# Patient Record
Sex: FEMALE | Race: OTHER | NOT HISPANIC OR LATINO | Employment: OTHER | URBAN - METROPOLITAN AREA
[De-identification: names, ages, dates, MRNs, and addresses within clinical notes are randomized per-mention and may not be internally consistent; named-entity substitution may affect disease eponyms.]

---

## 2023-07-28 ENCOUNTER — OFFICE VISIT (OUTPATIENT)
Dept: PRIMARY CARE | Facility: CLINIC | Age: 33
End: 2023-07-28
Payer: COMMERCIAL

## 2023-07-28 VITALS
HEART RATE: 83 BPM | SYSTOLIC BLOOD PRESSURE: 150 MMHG | BODY MASS INDEX: 50.02 KG/M2 | TEMPERATURE: 96 F | DIASTOLIC BLOOD PRESSURE: 80 MMHG | HEIGHT: 64 IN | WEIGHT: 293 LBS | OXYGEN SATURATION: 96 %

## 2023-07-28 DIAGNOSIS — Z00.00 PHYSICAL EXAM: ICD-10-CM

## 2023-07-28 DIAGNOSIS — F41.9 ANXIETY: Primary | ICD-10-CM

## 2023-07-28 DIAGNOSIS — R09.81 CHRONIC NASAL CONGESTION: ICD-10-CM

## 2023-07-28 DIAGNOSIS — E66.01 CLASS 3 SEVERE OBESITY DUE TO EXCESS CALORIES WITHOUT SERIOUS COMORBIDITY WITH BODY MASS INDEX (BMI) OF 60.0 TO 69.9 IN ADULT (MULTI): ICD-10-CM

## 2023-07-28 DIAGNOSIS — I10 HYPERTENSION, UNSPECIFIED TYPE: ICD-10-CM

## 2023-07-28 PROCEDURE — 3077F SYST BP >= 140 MM HG: CPT | Performed by: NURSE PRACTITIONER

## 2023-07-28 PROCEDURE — 3008F BODY MASS INDEX DOCD: CPT | Performed by: NURSE PRACTITIONER

## 2023-07-28 PROCEDURE — 1036F TOBACCO NON-USER: CPT | Performed by: NURSE PRACTITIONER

## 2023-07-28 PROCEDURE — 3079F DIAST BP 80-89 MM HG: CPT | Performed by: NURSE PRACTITIONER

## 2023-07-28 PROCEDURE — 99385 PREV VISIT NEW AGE 18-39: CPT | Performed by: NURSE PRACTITIONER

## 2023-07-28 RX ORDER — FLUOXETINE 10 MG/1
10 CAPSULE ORAL
Qty: 30 CAPSULE | Refills: 5 | Status: SHIPPED | OUTPATIENT
Start: 2023-07-28 | End: 2023-08-21

## 2023-07-28 RX ORDER — HYDROCHLOROTHIAZIDE 25 MG/1
25 TABLET ORAL DAILY
COMMUNITY
Start: 2018-02-14 | End: 2023-07-28 | Stop reason: SDUPTHER

## 2023-07-28 RX ORDER — ARIPIPRAZOLE 5 MG/1
5 TABLET ORAL DAILY
COMMUNITY
Start: 2018-04-16 | End: 2023-11-09 | Stop reason: SDUPTHER

## 2023-07-28 RX ORDER — FLUOXETINE 10 MG/1
10 CAPSULE ORAL
COMMUNITY
End: 2023-07-28 | Stop reason: SDUPTHER

## 2023-07-28 RX ORDER — HYDROCHLOROTHIAZIDE 25 MG/1
25 TABLET ORAL DAILY
Qty: 30 TABLET | Refills: 5 | Status: SHIPPED | OUTPATIENT
Start: 2023-07-28 | End: 2023-08-21

## 2023-07-28 ASSESSMENT — LIFESTYLE VARIABLES: HOW OFTEN DO YOU HAVE A DRINK CONTAINING ALCOHOL: NEVER

## 2023-07-28 ASSESSMENT — PATIENT HEALTH QUESTIONNAIRE - PHQ9
SUM OF ALL RESPONSES TO PHQ9 QUESTIONS 1 AND 2: 0
1. LITTLE INTEREST OR PLEASURE IN DOING THINGS: NOT AT ALL
2. FEELING DOWN, DEPRESSED OR HOPELESS: NOT AT ALL

## 2023-07-28 NOTE — PATIENT INSTRUCTIONS
Continue medications as prescribed.  Healthy diet and drink plenty of water.  Please have labs drawn-You will be notified with abnormal results only.    SEE MY CHART FOR RESULT.- If you have any questions please do not hesitate to notify our office.    Please schedule all necessary health screenings ophthalmology and dentist.    Follow-up in 4 MONTHS SOONER IF NEEDED.   Call office any new concerns.
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
## 2023-10-18 DIAGNOSIS — R10.2 PELVIC PAIN SYNDROME: Primary | ICD-10-CM

## 2023-10-26 PROBLEM — R10.2 PELVIC PAIN SYNDROME: Status: ACTIVE | Noted: 2023-10-18

## 2023-11-09 ENCOUNTER — OFFICE VISIT (OUTPATIENT)
Dept: PRIMARY CARE | Facility: CLINIC | Age: 33
End: 2023-11-09
Payer: COMMERCIAL

## 2023-11-09 VITALS
BODY MASS INDEX: 62.41 KG/M2 | RESPIRATION RATE: 18 BRPM | OXYGEN SATURATION: 99 % | HEART RATE: 76 BPM | SYSTOLIC BLOOD PRESSURE: 118 MMHG | WEIGHT: 293 LBS | DIASTOLIC BLOOD PRESSURE: 74 MMHG | TEMPERATURE: 97.7 F

## 2023-11-09 DIAGNOSIS — F31.10 BIPOLAR AFFECTIVE DISORDER, CURRENT EPISODE MANIC, CURRENT EPISODE SEVERITY UNSPECIFIED (MULTI): Primary | ICD-10-CM

## 2023-11-09 DIAGNOSIS — F41.9 ANXIETY: ICD-10-CM

## 2023-11-09 DIAGNOSIS — I10 HYPERTENSION, UNSPECIFIED TYPE: ICD-10-CM

## 2023-11-09 PROCEDURE — 1036F TOBACCO NON-USER: CPT | Performed by: NURSE PRACTITIONER

## 2023-11-09 PROCEDURE — 3074F SYST BP LT 130 MM HG: CPT | Performed by: NURSE PRACTITIONER

## 2023-11-09 PROCEDURE — 3078F DIAST BP <80 MM HG: CPT | Performed by: NURSE PRACTITIONER

## 2023-11-09 PROCEDURE — 3008F BODY MASS INDEX DOCD: CPT | Performed by: NURSE PRACTITIONER

## 2023-11-09 PROCEDURE — 99214 OFFICE O/P EST MOD 30 MIN: CPT | Performed by: NURSE PRACTITIONER

## 2023-11-09 RX ORDER — HYDROCHLOROTHIAZIDE 25 MG/1
25 TABLET ORAL DAILY
Qty: 90 TABLET | Refills: 2 | Status: SHIPPED | OUTPATIENT
Start: 2023-11-09 | End: 2024-11-08

## 2023-11-09 RX ORDER — ARIPIPRAZOLE 5 MG/1
5 TABLET ORAL DAILY
Qty: 90 TABLET | Refills: 1 | Status: SHIPPED | OUTPATIENT
Start: 2023-11-09 | End: 2024-11-08

## 2023-11-09 RX ORDER — FLUOXETINE 10 MG/1
10 CAPSULE ORAL DAILY
Qty: 90 CAPSULE | Refills: 1 | Status: SHIPPED | OUTPATIENT
Start: 2023-11-09 | End: 2024-11-08

## 2023-11-09 ASSESSMENT — PATIENT HEALTH QUESTIONNAIRE - PHQ9
1. LITTLE INTEREST OR PLEASURE IN DOING THINGS: NOT AT ALL
2. FEELING DOWN, DEPRESSED OR HOPELESS: NOT AT ALL
SUM OF ALL RESPONSES TO PHQ9 QUESTIONS 1 AND 2: 0

## 2023-11-09 ASSESSMENT — ENCOUNTER SYMPTOMS: NERVOUS/ANXIOUS: 1

## 2023-11-09 ASSESSMENT — PAIN SCALES - GENERAL: PAINLEVEL: 0-NO PAIN

## 2023-11-09 NOTE — PROGRESS NOTES
Subjective   Patient ID: Alayna Andrea is a 33 y.o. female who presents for 4 month follow up (Anxiety).    HPI     Patient presents to clinic for follow-up visit.  States she has been doing okay she has no specific complaints or concerns today.  Has been working on weight loss.  She states she has been eating a healthy diet and eating lots of vegetables.  Patient with history of anxiety depression bipolar being followed by counselors weekly at [facility].  She tells me she has taken anger management class she lost her children who is currently living with her mother but states she would like to regain custody of her children.        Appetite normal bowel and bladder normal.        Review of Systems   Psychiatric/Behavioral:  The patient is nervous/anxious.        Objective   /74 (BP Location: Right arm, Patient Position: Sitting, BP Cuff Size: Large adult)   Pulse 76   Temp 36.5 °C (97.7 °F) (Temporal)   Resp 18   Wt (!) 165 kg (363 lb 9.6 oz)   LMP 11/04/2023 (Exact Date)   SpO2 99%   BMI 62.41 kg/m²     Physical Exam  Vitals reviewed.   Constitutional:       Appearance: Normal appearance.   Cardiovascular:      Rate and Rhythm: Normal rate and regular rhythm.   Pulmonary:      Effort: Pulmonary effort is normal.      Breath sounds: Normal breath sounds.   Musculoskeletal:         General: Normal range of motion.   Skin:     General: Skin is warm and dry.   Neurological:      Mental Status: She is alert and oriented to person, place, and time. Mental status is at baseline.   Psychiatric:         Mood and Affect: Mood normal.      Comments: Very Talkative         Assessment/Plan   Problem List Items Addressed This Visit    None  Visit Diagnoses       Bipolar affective disorder, current episode manic, current episode severity unspecified (CMS/HCC)    -  Primary    Anxiety        Relevant Medications    ARIPiprazole (Abilify) 5 mg tablet    FLUoxetine (PROzac) 10 mg capsule  Follow-up with  counselors and psychiatrist at [facility] as scheduled.      Hypertension, unspecified type      Controlled at 118/74    Relevant Medications    hydroCHLOROthiazide (HYDRODiuril) 25 mg tablet  Healthy low-sodium diet  Follow-up 4 months.

## 2023-11-09 NOTE — PATIENT INSTRUCTIONS
Continue medications as prescribed.  Healthy diet and drink plenty of water.  PLEASE FOLLOW-UP WITH PSYCHIATRY AT [facility].  Follow-up in 4 MONTHS SOONER IF NEEDED.  Call office any new concerns.

## 2023-11-13 ENCOUNTER — APPOINTMENT (OUTPATIENT)
Dept: ORTHOPEDIC SURGERY | Facility: CLINIC | Age: 33
End: 2023-11-13
Payer: COMMERCIAL

## 2023-11-13 ENCOUNTER — PRE-ADMISSION TESTING (OUTPATIENT)
Dept: PREADMISSION TESTING | Facility: HOSPITAL | Age: 33
End: 2023-11-13
Payer: COMMERCIAL

## 2023-11-13 VITALS
BODY MASS INDEX: 50.02 KG/M2 | WEIGHT: 293 LBS | OXYGEN SATURATION: 99 % | HEART RATE: 80 BPM | TEMPERATURE: 98.7 F | DIASTOLIC BLOOD PRESSURE: 84 MMHG | HEIGHT: 64 IN | SYSTOLIC BLOOD PRESSURE: 142 MMHG

## 2023-11-13 DIAGNOSIS — I10 HYPERTENSION, UNSPECIFIED TYPE: ICD-10-CM

## 2023-11-13 DIAGNOSIS — R01.1 MURMUR, CARDIAC: ICD-10-CM

## 2023-11-13 DIAGNOSIS — Z01.818 PREOPERATIVE EXAMINATION: Primary | ICD-10-CM

## 2023-11-13 LAB
ABO GROUP (TYPE) IN BLOOD: NORMAL
ANION GAP SERPL CALC-SCNC: 11 MMOL/L (ref 10–20)
ANTIBODY SCREEN: NORMAL
BUN SERPL-MCNC: 11 MG/DL (ref 6–23)
CALCIUM SERPL-MCNC: 9.3 MG/DL (ref 8.6–10.6)
CHLORIDE SERPL-SCNC: 104 MMOL/L (ref 98–107)
CO2 SERPL-SCNC: 30 MMOL/L (ref 21–32)
CREAT SERPL-MCNC: 0.62 MG/DL (ref 0.5–1.05)
ERYTHROCYTE [DISTWIDTH] IN BLOOD BY AUTOMATED COUNT: 13 % (ref 11.5–14.5)
GFR SERPL CREATININE-BSD FRML MDRD: >90 ML/MIN/1.73M*2
GLUCOSE SERPL-MCNC: 93 MG/DL (ref 74–99)
HCT VFR BLD AUTO: 37 % (ref 36–46)
HGB BLD-MCNC: 11.8 G/DL (ref 12–16)
MCH RBC QN AUTO: 29.3 PG (ref 26–34)
MCHC RBC AUTO-ENTMCNC: 31.9 G/DL (ref 32–36)
MCV RBC AUTO: 92 FL (ref 80–100)
NRBC BLD-RTO: 0 /100 WBCS (ref 0–0)
PLATELET # BLD AUTO: 200 X10*3/UL (ref 150–450)
POTASSIUM SERPL-SCNC: 3.9 MMOL/L (ref 3.5–5.3)
RBC # BLD AUTO: 4.03 X10*6/UL (ref 4–5.2)
RH FACTOR (ANTIGEN D): NORMAL
SODIUM SERPL-SCNC: 141 MMOL/L (ref 136–145)
WBC # BLD AUTO: 5.5 X10*3/UL (ref 4.4–11.3)

## 2023-11-13 PROCEDURE — 36415 COLL VENOUS BLD VENIPUNCTURE: CPT

## 2023-11-13 PROCEDURE — 85027 COMPLETE CBC AUTOMATED: CPT

## 2023-11-13 PROCEDURE — 87081 CULTURE SCREEN ONLY: CPT

## 2023-11-13 PROCEDURE — 86850 RBC ANTIBODY SCREEN: CPT

## 2023-11-13 PROCEDURE — 80048 BASIC METABOLIC PNL TOTAL CA: CPT

## 2023-11-13 PROCEDURE — 99205 OFFICE O/P NEW HI 60 MIN: CPT | Performed by: NURSE PRACTITIONER

## 2023-11-13 RX ORDER — CHLORHEXIDINE GLUCONATE ORAL RINSE 1.2 MG/ML
SOLUTION DENTAL
Qty: 473 ML | Refills: 0 | Status: SHIPPED | OUTPATIENT
Start: 2023-11-13 | End: 2023-11-17 | Stop reason: HOSPADM

## 2023-11-13 RX ORDER — CHLORHEXIDINE GLUCONATE 40 MG/ML
SOLUTION TOPICAL 2 TIMES DAILY
Qty: 473 ML | Refills: 0 | Status: SHIPPED | OUTPATIENT
Start: 2023-11-13 | End: 2023-11-17 | Stop reason: HOSPADM

## 2023-11-13 ASSESSMENT — ENCOUNTER SYMPTOMS
RESPIRATORY NEGATIVE: 1
EYES NEGATIVE: 1
NEUROLOGICAL NEGATIVE: 1
CONSTITUTIONAL NEGATIVE: 1
CARDIOVASCULAR NEGATIVE: 1
NECK NEGATIVE: 1
GASTROINTESTINAL NEGATIVE: 1
MUSCULOSKELETAL NEGATIVE: 1
ENDOCRINE NEGATIVE: 1

## 2023-11-13 ASSESSMENT — DUKE ACTIVITY SCORE INDEX (DASI)
CAN YOU PARTICIPATE IN MODERATE RECREATIONAL ACTIVITIES LIKE GOLF, BOWLING, DANCING, DOUBLES TENNIS OR THROWING A BASEBALL OR FOOTBALL: NO
CAN YOU RUN A SHORT DISTANCE: NO
CAN YOU WALK A BLOCK OR TWO ON LEVEL GROUND: NO
CAN YOU CLIMB A FLIGHT OF STAIRS OR WALK UP A HILL: YES
CAN YOU TAKE CARE OF YOURSELF (EAT, DRESS, BATHE, OR USE TOILET): YES
DASI METS SCORE: 4.7
CAN YOU DO YARD WORK LIKE RAKING LEAVES, WEEDING OR PUSHING A MOWER: NO
CAN YOU DO MODERATE WORK AROUND THE HOUSE LIKE VACUUMING, SWEEPING FLOORS OR CARRYING GROCERIES: YES
CAN YOU DO LIGHT WORK AROUND THE HOUSE LIKE DUSTING OR WASHING DISHES: YES
CAN YOU PARTICIPATE IN STRENOUS SPORTS LIKE SWIMMING, SINGLES TENNIS, FOOTBALL, BASKETBALL, OR SKIING: NO
CAN YOU WALK INDOORS, SUCH AS AROUND YOUR HOUSE: YES
CAN YOU HAVE SEXUAL RELATIONS: NO
CAN YOU DO HEAVY WORK AROUND THE HOUSE LIKE SCRUBBING FLOORS OR LIFTING AND MOVING HEAVY FURNITURE: NO
TOTAL_SCORE: 16.2

## 2023-11-13 ASSESSMENT — LIFESTYLE VARIABLES: SMOKING_STATUS: NONSMOKER

## 2023-11-13 NOTE — PREPROCEDURE INSTRUCTIONS
NPO Instructions:    Do not eat any food after midnight the night before your surgery/procedure.  You may have 10 ounces of clear liquids until TWO hours before surgery/procedure. This includes water, black tea/coffee, (no milk or cream) apple juice and electrolyte drinks (Gatorade).  You may chew gum up to TWO hours before your surgery/procedure.    Additional Instructions:     The Day before Surgery:  Review your medication instructions, stop indicated medications  You will be contacted in the evening regarding the time of your arrival to facility and surgery time  Day of Surgery:  Review your medication instructions, take indicated medications  Wear  comfortable loose fitting clothing  Do not use moisturizers, creams, lotions or perfume  All jewelry and valuables should be left at home    Samantha Meeson, MSN, NP-C  Adult-Gerontology Nurse Practitioner II  Department of Anesthesiology and Perioperative Medicine  Main phone [phone]  Direct phone [phone]  Fax [phone]

## 2023-11-13 NOTE — CPM/PAT H&P
CPM/PAT Evaluation       Name: Alayna Andrea (Alayna Andrea)  /Age: 1990/33 y.o.     Visit Type:   In-Person       Chief Complaint: hip/pelvic pain scheduled for surgery    HPI: Patient is a 33-year-old female scheduled for removal of hardware from the pelvis on 2023 for treatment of painful hardware.  The patient is referred by  For preoperative evaluation of hypertension, anxiety, depression, anemia, closed pelvic ring fracture with repair with hardware, morbid obesity actively losing weight.    Past Medical History:   Diagnosis Date    Acute pain of both hips     Anemia     Anxiety     Bilateral hearing loss     Closed fracture of temporal bone with routine healing, subsequent encounter     Closed pelvic ring fracture with routine healing, subsequent encounter     Depression     Hypertension     Impaired gait and mobility     Morbid obesity (CMS/HCC)     Peripheral neuropathy     Vertigo        Past Surgical History:   Procedure Laterality Date     SECTION, CLASSIC  2020    CT ANGIO NECK  11/10/2018    CT NECK ANGIO W AND WO IV CONTRAST 11/10/2018 [facility] CLINICAL LEGACY    CT HEAD ANGIO W AND WO IV CONTRAST  11/10/2018    CT HEAD ANGIO W AND WO IV CONTRAST 11/10/2018 [facility] CLINICAL LEGACY    HIP SURGERY      NASAL SEPTUM SURGERY         Patient  reports that she is not currently sexually active and has had partner(s) who are male. She reports using the following method of birth control/protection: Abstinence.    Family History   Problem Relation Name Age of Onset    Diabetes Mother      No Known Problems Father      No Known Problems Sister      No Known Problems Brother      No Known Problems Daughter      No Known Problems Son      No Known Problems Mother's Sister      No Known Problems Mother's Brother      No Known Problems Father's Sister      No Known Problems Father's Brother      No Known Problems Maternal Grandmother      No Known Problems Maternal Grandfather       "No Known Problems Paternal Grandmother      No Known Problems Paternal Grandfather      No Known Problems Other         Allergies   Allergen Reactions    Shellfish Derived Other and Itching    Enoxaparin Itching and Other     \"My skin was burned up\"    Heparin Other     \"My arm was numb for 3 months\"    House Dust Other    Iodine Itching and Other     \"burning,throw up blood\"    Other reaction(s): Other: See Comments   \"burning,throw up blood\"    Ketorolac Other and Swelling     tongue swelling    \"my gums swelled up and start itching\", tolerates ibuprofen    Mold Other    Other Itching    Procaine Itching     Tolerates lidocaine patches 7/2019       Prior to Admission medications    Medication Sig Start Date End Date Taking? Authorizing Provider   ARIPiprazole (Abilify) 5 mg tablet Take 1 tablet (5 mg) by mouth once daily. 11/9/23 11/8/24  BIRD Garcia   chlorhexidine (Hibiclens) 4 % external liquid Apply topically 2 times a day for 5 days. 11/13/23 11/18/23  Samantha A Meeson, APRN-CNP   chlorhexidine (Peridex) 0.12 % solution Swish and spit 15 mL night before surgery and morning of surgery 11/13/23   Samantha A Meeson, APRN-CNP   FLUoxetine (PROzac) 10 mg capsule Take 1 capsule (10 mg) by mouth once daily. 11/9/23 11/8/24  BIRD Garcia   hydroCHLOROthiazide (HYDRODiuril) 25 mg tablet Take 1 tablet (25 mg) by mouth once daily. 11/9/23 11/8/24  BIRD Garcia   ARIPiprazole (Abilify) 5 mg tablet Take 1 tablet (5 mg) by mouth once daily. 4/16/18 11/9/23  Historical Provider, MD   FLUoxetine (PROzac) 10 mg capsule TAKE 1 CAPSULE BY MOUTH EVERY DAY 9/6/23 11/9/23  BIRD Garcia   hydroCHLOROthiazide (HYDRODiuril) 25 mg tablet TAKE 1 TABLET BY MOUTH EVERY DAY 9/6/23 11/9/23  Angela A Clem-Burden, APRN-CNP        PAT ROS:   Constitutional:   neg    Neuro/Psych:   neg    Eyes:   neg     use of corrective lenses  Ears:   neg  "   Nose:   neg    Mouth:   neg    Throat:   neg    Neck:   neg    Cardio:   neg    Respiratory:   neg    Endocrine:   neg    GI:   neg    :   neg    Musculoskeletal:   neg    Hematologic:   neg    Skin:  neg        Physical Exam  Vitals reviewed.   Constitutional:       Appearance: Normal appearance. She is morbidly obese.   HENT:      Head: Normocephalic.      Mouth/Throat:      Mouth: Mucous membranes are dry.   Eyes:      Conjunctiva/sclera: Conjunctivae normal.   Neck:      Vascular: No carotid bruit.   Cardiovascular:      Rate and Rhythm: Normal rate and regular rhythm.      Pulses: Normal pulses.      Heart sounds: Normal heart sounds.   Pulmonary:      Effort: Pulmonary effort is normal.      Breath sounds: Normal breath sounds.   Abdominal:      Palpations: Abdomen is soft.      Tenderness: There is no abdominal tenderness.   Musculoskeletal:         General: Normal range of motion.      Cervical back: Normal range of motion.      Right lower leg: No edema.      Left lower leg: No edema.   Lymphadenopathy:      Cervical: No cervical adenopathy.   Skin:     General: Skin is warm and dry.      Capillary Refill: Capillary refill takes less than 2 seconds.   Neurological:      General: No focal deficit present.      Mental Status: She is alert and oriented to person, place, and time.   Psychiatric:         Mood and Affect: Mood normal.         Behavior: Behavior normal.         Thought Content: Thought content normal.         Judgment: Judgment normal.          PAT AIRWAY:   Airway:     Mallampati::  III    Neck ROM::  Full  normal           Visit Vitals  /84   Pulse 80   Temp 37.1 °C (98.7 °F)       DASI Risk Score      Flowsheet Row Most Recent Value   DASI SCORE 16.2   METS Score (Will be calculated only when all the questions are answered) 4.7          Caprini DVT Assessment      Flowsheet Row Most Recent Value   DVT Score 6   Current Status Major surgery planned, including arthroscopic and  laproscopic (1-2 hours)   History Prior major surgery   Age Less than 40 years   BMI Greater than 50 (Venous stasis syndrome)          Modified Frailty Index      Flowsheet Row Most Recent Value   Modified Frailty Index Calculator .0909          CHADS2 Stroke Risk  Current as of about an hour ago        N/A 3 - 100%: High Risk   2 - 3%: Medium Risk   0 - 2%: Low Risk     Last Change: N/A          This score determines the patient's risk of having a stroke if the patient has atrial fibrillation.        This score is not applicable to this patient. Components are not calculated.          Revised Cardiac Risk Index      Flowsheet Row Most Recent Value   Revised Cardiac Risk Calculator 0          Apfel Simplified Score      Flowsheet Row Most Recent Value   Apfel Simplified Score Calculator 4          Risk Analysis Index Results This Encounter    No data found in the last 1 encounters.       Stop Bang Score      Flowsheet Row Most Recent Value   Do you snore loudly? 0   Do you often feel tired or fatigued after your sleep? 0   Has anyone ever observed you stop breathing in your sleep? 0   Do you have or are you being treated for high blood pressure? 1   Recent BMI (Calculated) 63.5   Is BMI greater than 35 kg/m2? 1=Yes   Age older than 50 years old? 0=No   Is your neck circumference greater than 17 inches (Male) or 16 inches (Female)? 0   Gender - Male 0=No   STOP-BANG Total Score 2            Assessment and Plan:   Neuro:  anxiety and depression managed with medication and non-medicinal therapies. The patient is at an increased risk for post operative delirium secondary to depression.  Preoperative brain exercises educational hand out provided to patient.    The patient is at an increased risk for perioperative stroke secondary to HTN, female sex and general anesthesia.    HEENT/Airway:  No diagnosis or significant findings on chart review or clinical presentation and evaluation.     Cardiovascular:  HTN managed with  diet and medications. EKG from [facility] 23 in care everywhere tab- NSR with sinus arrhythmia- no acute changes.  Murmur noted on physical exam- echo ordered.  Echo at [facility] in 2020 showed no significant valvular disease but no documented echo at that time.    METS are 4.7    RCRI 0  which is 3.9% 30 day risk of MACE (risk for cardiac death, nonfatal myocardial infarction, and nonfactal cardiac arrest    YESSENIA score which indicates a 0.1% risk of intraoperative or 30-day postoperative MACE      Pulmonary:  No diagnosis or significant findings on chart review or clinical presentation and evaluation.  Preoperative deep breathing educational handout provided to patient.    ARISCAT: 16 points which is a low risk of in-hospital post-op pulmonary complications     PRODIGY:   0 points which is a low risk of post op opioid induced respiratory depression episodes    STOP BAN points which is a low risk for moderate to severe MADHU    Renal: No diagnosis or significant findings on chart review or clinical presentation and evaluation, however, the patient is at increased risk of perioperative renal complications secondary to HTN. Preventative measures include preoperative hydration and managed on HTN.    Endocrine:  No diagnosis or significant findings on chart review or clinical presentation and evaluation.     Hematologic:   No diagnosis or significant findings on chart review or clinical presentation and evaluation.  Preoperative DVT educational handout provided to patient.    Caprini Score:  6  points which is a highest risk of perioperative VTE    Gastrointestinal:   No diagnosis or significant findings on chart review or clinical presentation and evaluation.     EAT-10 score of  1- self-perceived oropharyngeal dysphagia scale (0-40)     Apfel: 4 points  79% risk for post operative N/V    Infectious disease:  No diagnosis or significant findings on chart review or clinical presentation and evaluation.       Musculoskeletal:  hip/pelvic pain scheduled for hardware removal.       Labs ordered  Results for orders placed or performed in visit on 11/13/23 (from the past 96 hour(s))   CBC   Result Value Ref Range    WBC 5.5 4.4 - 11.3 x10*3/uL    nRBC 0.0 0.0 - 0.0 /100 WBCs    RBC 4.03 4.00 - 5.20 x10*6/uL    Hemoglobin 11.8 (L) 12.0 - 16.0 g/dL    Hematocrit 37.0 36.0 - 46.0 %    MCV 92 80 - 100 fL    MCH 29.3 26.0 - 34.0 pg    MCHC 31.9 (L) 32.0 - 36.0 g/dL    RDW 13.0 11.5 - 14.5 %    Platelets 200 150 - 450 x10*3/uL   Basic Metabolic Panel   Result Value Ref Range    Glucose 93 74 - 99 mg/dL    Sodium 141 136 - 145 mmol/L    Potassium 3.9 3.5 - 5.3 mmol/L    Chloride 104 98 - 107 mmol/L    Bicarbonate 30 21 - 32 mmol/L    Anion Gap 11 10 - 20 mmol/L    Urea Nitrogen 11 6 - 23 mg/dL    Creatinine 0.62 0.50 - 1.05 mg/dL    eGFR >90 >60 mL/min/1.73m*2    Calcium 9.3 8.6 - 10.6 mg/dL   Type And Screen   Result Value Ref Range    ABO TYPE O     Rh TYPE POS     ANTIBODY SCREEN NEG    Staphylococcus aureus/MRSA colonization, Culture    Specimen: Nares/Axilla/Groin; Swab   Result Value Ref Range    Staph/MRSA Screen Culture No Staphylococcus aureus isolated        ECHO 11/15/23 10am [facility]   PHYSICIAN INTERPRETATION:  Left Ventricle: The left ventricular systolic function is normal, with an estimated ejection fraction of 60%. There are no regional wall motion abnormalities. The left ventricular cavity size is normal. Spectral Doppler shows a normal pattern of left ventricular diastolic filling.  Left Atrium: The left atrium is normal in size.  Right Ventricle: The right ventricle is normal in size. There is normal right ventricular global systolic function.  Right Atrium: The right atrium is normal in size.  Aortic Valve: The aortic valve appears structurally normal. There is no evidence of aortic valve regurgitation. The peak instantaneous gradient of the aortic valve is 8.1 mmHg. The mean gradient of the aortic valve is  5.0 mmHg.  Mitral Valve: The mitral valve is normal in structure. There is trace mitral valve regurgitation.  Tricuspid Valve: The tricuspid valve is structurally normal. There is trace tricuspid regurgitation. The right ventricular systolic pressure is unable to be estimated.  Pulmonic Valve: The pulmonic valve is structurally normal. There is physiologic pulmonic valve regurgitation.  Pericardium: There is no pericardial effusion noted.  Aorta: The aortic root is normal.        CONCLUSIONS:   1. Left ventricular systolic function is normal with a 60% estimated ejection fraction.

## 2023-11-14 ENCOUNTER — APPOINTMENT (OUTPATIENT)
Dept: CARDIOLOGY | Facility: HOSPITAL | Age: 33
End: 2023-11-14
Payer: COMMERCIAL

## 2023-11-15 ENCOUNTER — HOSPITAL ENCOUNTER (OUTPATIENT)
Dept: CARDIOLOGY | Facility: HOSPITAL | Age: 33
Discharge: HOME | End: 2023-11-15
Payer: COMMERCIAL

## 2023-11-15 DIAGNOSIS — R01.1 MURMUR, CARDIAC: ICD-10-CM

## 2023-11-15 DIAGNOSIS — I10 HYPERTENSION, UNSPECIFIED TYPE: ICD-10-CM

## 2023-11-15 LAB
AORTIC VALVE MEAN GRADIENT: 5
AORTIC VALVE PEAK VELOCITY: 1.42
AV PEAK GRADIENT: 8.1
AVA (PEAK VEL): 2.68
AVA (VTI): 3.18
EJECTION FRACTION APICAL 4 CHAMBER: 80.5
EJECTION FRACTION: 74
LEFT VENTRICLE INTERNAL DIMENSION DIASTOLE: 4 (ref 3.5–6)
LEFT VENTRICULAR OUTFLOW TRACT DIAMETER: 2
MITRAL VALVE E/A RATIO: 1.52
RIGHT VENTRICLE FREE WALL PEAK S': 13.8
STAPHYLOCOCCUS SPEC CULT: NORMAL
TRICUSPID ANNULAR PLANE SYSTOLIC EXCURSION: 2.6

## 2023-11-15 PROCEDURE — 93306 TTE W/DOPPLER COMPLETE: CPT

## 2023-11-16 ENCOUNTER — ANESTHESIA (OUTPATIENT)
Dept: OPERATING ROOM | Facility: HOSPITAL | Age: 33
End: 2023-11-16
Payer: COMMERCIAL

## 2023-11-16 ENCOUNTER — APPOINTMENT (OUTPATIENT)
Dept: RADIOLOGY | Facility: HOSPITAL | Age: 33
End: 2023-11-16
Payer: COMMERCIAL

## 2023-11-16 ENCOUNTER — HOSPITAL ENCOUNTER (OUTPATIENT)
Facility: HOSPITAL | Age: 33
Setting detail: OBSERVATION
Discharge: HOME | End: 2023-11-17
Attending: ORTHOPAEDIC SURGERY | Admitting: ORTHOPAEDIC SURGERY
Payer: COMMERCIAL

## 2023-11-16 ENCOUNTER — ANESTHESIA EVENT (OUTPATIENT)
Dept: OPERATING ROOM | Facility: HOSPITAL | Age: 33
End: 2023-11-16
Payer: COMMERCIAL

## 2023-11-16 DIAGNOSIS — E66.813 CLASS 3 SEVERE OBESITY DUE TO EXCESS CALORIES WITH BODY MASS INDEX (BMI) OF 60.0 TO 69.9 IN ADULT, UNSPECIFIED WHETHER SERIOUS COMORBIDITY PRESENT: ICD-10-CM

## 2023-11-16 DIAGNOSIS — R10.2 PELVIC PAIN SYNDROME: Primary | ICD-10-CM

## 2023-11-16 DIAGNOSIS — E66.01 CLASS 3 SEVERE OBESITY DUE TO EXCESS CALORIES WITH BODY MASS INDEX (BMI) OF 60.0 TO 69.9 IN ADULT, UNSPECIFIED WHETHER SERIOUS COMORBIDITY PRESENT (MULTI): ICD-10-CM

## 2023-11-16 PROBLEM — D64.9 ANEMIA: Status: ACTIVE | Noted: 2023-11-16

## 2023-11-16 PROBLEM — F32.A DEPRESSION: Status: ACTIVE | Noted: 2023-11-16

## 2023-11-16 PROBLEM — F41.9 ANXIETY: Status: ACTIVE | Noted: 2023-11-16

## 2023-11-16 PROBLEM — I10 HTN (HYPERTENSION): Status: ACTIVE | Noted: 2023-11-16

## 2023-11-16 PROBLEM — T84.84XA PAINFUL ORTHOPAEDIC HARDWARE (CMS-HCC): Status: ACTIVE | Noted: 2023-11-16

## 2023-11-16 LAB — PREGNANCY TEST URINE, POC: NEGATIVE

## 2023-11-16 PROCEDURE — 20680 REMOVAL OF IMPLANT DEEP: CPT | Performed by: ORTHOPAEDIC SURGERY

## 2023-11-16 PROCEDURE — 96365 THER/PROPH/DIAG IV INF INIT: CPT

## 2023-11-16 PROCEDURE — 3600000007 HC OR TIME - EACH INCREMENTAL 1 MINUTE - PROCEDURE LEVEL TWO: Performed by: ORTHOPAEDIC SURGERY

## 2023-11-16 PROCEDURE — A4217 STERILE WATER/SALINE, 500 ML: HCPCS | Mod: SE | Performed by: ORTHOPAEDIC SURGERY

## 2023-11-16 PROCEDURE — 2500000005 HC RX 250 GENERAL PHARMACY W/O HCPCS: Mod: SE | Performed by: ANESTHESIOLOGY

## 2023-11-16 PROCEDURE — 3600000002 HC OR TIME - INITIAL BASE CHARGE - PROCEDURE LEVEL TWO: Performed by: ORTHOPAEDIC SURGERY

## 2023-11-16 PROCEDURE — 3700000002 HC GENERAL ANESTHESIA TIME - EACH INCREMENTAL 1 MINUTE: Performed by: ORTHOPAEDIC SURGERY

## 2023-11-16 PROCEDURE — 2720000007 HC OR 272 NO HCPCS: Performed by: ORTHOPAEDIC SURGERY

## 2023-11-16 PROCEDURE — 2500000001 HC RX 250 WO HCPCS SELF ADMINISTERED DRUGS (ALT 637 FOR MEDICARE OP): Mod: SE | Performed by: ANESTHESIOLOGY

## 2023-11-16 PROCEDURE — 81025 URINE PREGNANCY TEST: CPT | Performed by: ORTHOPAEDIC SURGERY

## 2023-11-16 PROCEDURE — 2500000005 HC RX 250 GENERAL PHARMACY W/O HCPCS: Mod: SE | Performed by: ANESTHESIOLOGIST ASSISTANT

## 2023-11-16 PROCEDURE — 2500000004 HC RX 250 GENERAL PHARMACY W/ HCPCS (ALT 636 FOR OP/ED): Mod: SE

## 2023-11-16 PROCEDURE — 76000 FLUOROSCOPY <1 HR PHYS/QHP: CPT | Mod: CCI

## 2023-11-16 PROCEDURE — 7100000001 HC RECOVERY ROOM TIME - INITIAL BASE CHARGE: Performed by: ORTHOPAEDIC SURGERY

## 2023-11-16 PROCEDURE — 2500000001 HC RX 250 WO HCPCS SELF ADMINISTERED DRUGS (ALT 637 FOR MEDICARE OP): Mod: SE

## 2023-11-16 PROCEDURE — G0378 HOSPITAL OBSERVATION PER HR: HCPCS

## 2023-11-16 PROCEDURE — 7100000010 HC PHASE TWO TIME - EACH INCREMENTAL 1 MINUTE: Performed by: ORTHOPAEDIC SURGERY

## 2023-11-16 PROCEDURE — 2500000004 HC RX 250 GENERAL PHARMACY W/ HCPCS (ALT 636 FOR OP/ED): Mod: SE | Performed by: ANESTHESIOLOGY

## 2023-11-16 PROCEDURE — 7100000002 HC RECOVERY ROOM TIME - EACH INCREMENTAL 1 MINUTE: Performed by: ORTHOPAEDIC SURGERY

## 2023-11-16 PROCEDURE — 2500000004 HC RX 250 GENERAL PHARMACY W/ HCPCS (ALT 636 FOR OP/ED): Mod: SE | Performed by: ORTHOPAEDIC SURGERY

## 2023-11-16 PROCEDURE — 3700000001 HC GENERAL ANESTHESIA TIME - INITIAL BASE CHARGE: Performed by: ORTHOPAEDIC SURGERY

## 2023-11-16 PROCEDURE — C1713 ANCHOR/SCREW BN/BN,TIS/BN: HCPCS | Performed by: ORTHOPAEDIC SURGERY

## 2023-11-16 PROCEDURE — A20680 PR REMOVAL DEEP IMPLANT: Performed by: ANESTHESIOLOGIST ASSISTANT

## 2023-11-16 PROCEDURE — A20680 PERIPHERAL IV: Performed by: ANESTHESIOLOGY

## 2023-11-16 PROCEDURE — 7100000009 HC PHASE TWO TIME - INITIAL BASE CHARGE: Performed by: ORTHOPAEDIC SURGERY

## 2023-11-16 PROCEDURE — 2500000004 HC RX 250 GENERAL PHARMACY W/ HCPCS (ALT 636 FOR OP/ED): Mod: SE | Performed by: ANESTHESIOLOGIST ASSISTANT

## 2023-11-16 DEVICE — GUIDEWIRE, 2.8 W/FLUTES: Type: IMPLANTABLE DEVICE | Site: PELVIS | Status: NON-FUNCTIONAL

## 2023-11-16 RX ORDER — ASPIRIN 81 MG/1
81 TABLET ORAL 2 TIMES DAILY
Status: DISCONTINUED | OUTPATIENT
Start: 2023-11-17 | End: 2023-11-17 | Stop reason: HOSPADM

## 2023-11-16 RX ORDER — ONDANSETRON HYDROCHLORIDE 2 MG/ML
INJECTION, SOLUTION INTRAVENOUS AS NEEDED
Status: DISCONTINUED | OUTPATIENT
Start: 2023-11-16 | End: 2023-11-16

## 2023-11-16 RX ORDER — OXYCODONE HYDROCHLORIDE 5 MG/1
5 TABLET ORAL EVERY 4 HOURS PRN
Status: DISCONTINUED | OUTPATIENT
Start: 2023-11-16 | End: 2023-11-17 | Stop reason: HOSPADM

## 2023-11-16 RX ORDER — TRANEXAMIC ACID 100 MG/ML
INJECTION, SOLUTION INTRAVENOUS AS NEEDED
Status: DISCONTINUED | OUTPATIENT
Start: 2023-11-16 | End: 2023-11-16

## 2023-11-16 RX ORDER — CEPHALEXIN 500 MG/1
500 CAPSULE ORAL 3 TIMES DAILY
Qty: 3 CAPSULE | Refills: 0 | Status: SHIPPED | OUTPATIENT
Start: 2023-11-16 | End: 2023-11-17

## 2023-11-16 RX ORDER — AMOXICILLIN 250 MG
1 CAPSULE ORAL DAILY
Qty: 7 TABLET | Refills: 2 | Status: SHIPPED | OUTPATIENT
Start: 2023-11-16 | End: 2023-12-07

## 2023-11-16 RX ORDER — ARIPIPRAZOLE 5 MG/1
5 TABLET ORAL DAILY
Status: DISCONTINUED | OUTPATIENT
Start: 2023-11-16 | End: 2023-11-17 | Stop reason: HOSPADM

## 2023-11-16 RX ORDER — LIDOCAINE HYDROCHLORIDE 20 MG/ML
INJECTION, SOLUTION INFILTRATION; PERINEURAL AS NEEDED
Status: DISCONTINUED | OUTPATIENT
Start: 2023-11-16 | End: 2023-11-16

## 2023-11-16 RX ORDER — FENTANYL CITRATE 50 UG/ML
INJECTION, SOLUTION INTRAMUSCULAR; INTRAVENOUS AS NEEDED
Status: DISCONTINUED | OUTPATIENT
Start: 2023-11-16 | End: 2023-11-16

## 2023-11-16 RX ORDER — PROPOFOL 10 MG/ML
INJECTION, EMULSION INTRAVENOUS AS NEEDED
Status: DISCONTINUED | OUTPATIENT
Start: 2023-11-16 | End: 2023-11-16

## 2023-11-16 RX ORDER — OXYCODONE HYDROCHLORIDE 5 MG/1
5 TABLET ORAL EVERY 4 HOURS PRN
Status: DISCONTINUED | OUTPATIENT
Start: 2023-11-16 | End: 2023-11-16 | Stop reason: HOSPADM

## 2023-11-16 RX ORDER — SODIUM CHLORIDE, SODIUM LACTATE, POTASSIUM CHLORIDE, CALCIUM CHLORIDE 600; 310; 30; 20 MG/100ML; MG/100ML; MG/100ML; MG/100ML
INJECTION, SOLUTION INTRAVENOUS CONTINUOUS PRN
Status: DISCONTINUED | OUTPATIENT
Start: 2023-11-16 | End: 2023-11-16

## 2023-11-16 RX ORDER — LIDOCAINE HYDROCHLORIDE 10 MG/ML
0.1 INJECTION, SOLUTION EPIDURAL; INFILTRATION; INTRACAUDAL; PERINEURAL ONCE
Status: DISCONTINUED | OUTPATIENT
Start: 2023-11-16 | End: 2023-11-16 | Stop reason: HOSPADM

## 2023-11-16 RX ORDER — NEOSTIGMINE METHYLSULFATE 5 MG/5 ML
SYRINGE (ML) INTRAVENOUS AS NEEDED
Status: DISCONTINUED | OUTPATIENT
Start: 2023-11-16 | End: 2023-11-16

## 2023-11-16 RX ORDER — OXYCODONE AND ACETAMINOPHEN 5; 325 MG/1; MG/1
1 TABLET ORAL EVERY 8 HOURS PRN
Qty: 21 TABLET | Refills: 0 | Status: SHIPPED | OUTPATIENT
Start: 2023-11-16 | End: 2023-11-23

## 2023-11-16 RX ORDER — DEXAMETHASONE SODIUM PHOSPHATE 4 MG/ML
INJECTION, SOLUTION INTRA-ARTICULAR; INTRALESIONAL; INTRAMUSCULAR; INTRAVENOUS; SOFT TISSUE AS NEEDED
Status: DISCONTINUED | OUTPATIENT
Start: 2023-11-16 | End: 2023-11-16

## 2023-11-16 RX ORDER — DOCUSATE SODIUM 100 MG/1
100 CAPSULE, LIQUID FILLED ORAL 2 TIMES DAILY
Status: DISCONTINUED | OUTPATIENT
Start: 2023-11-16 | End: 2023-11-17 | Stop reason: HOSPADM

## 2023-11-16 RX ORDER — HYDROMORPHONE HYDROCHLORIDE 1 MG/ML
0.5 INJECTION, SOLUTION INTRAMUSCULAR; INTRAVENOUS; SUBCUTANEOUS EVERY 5 MIN PRN
Status: DISCONTINUED | OUTPATIENT
Start: 2023-11-16 | End: 2023-11-16 | Stop reason: HOSPADM

## 2023-11-16 RX ORDER — ONDANSETRON 4 MG/1
4 TABLET, FILM COATED ORAL EVERY 8 HOURS PRN
Status: DISCONTINUED | OUTPATIENT
Start: 2023-11-16 | End: 2023-11-17 | Stop reason: HOSPADM

## 2023-11-16 RX ORDER — LABETALOL HYDROCHLORIDE 5 MG/ML
5 INJECTION, SOLUTION INTRAVENOUS ONCE AS NEEDED
Status: DISCONTINUED | OUTPATIENT
Start: 2023-11-16 | End: 2023-11-16 | Stop reason: HOSPADM

## 2023-11-16 RX ORDER — HYDROMORPHONE HYDROCHLORIDE 1 MG/ML
0.2 INJECTION, SOLUTION INTRAMUSCULAR; INTRAVENOUS; SUBCUTANEOUS EVERY 5 MIN PRN
Status: DISCONTINUED | OUTPATIENT
Start: 2023-11-16 | End: 2023-11-16 | Stop reason: HOSPADM

## 2023-11-16 RX ORDER — FLUOXETINE 10 MG/1
10 CAPSULE ORAL DAILY
Status: DISCONTINUED | OUTPATIENT
Start: 2023-11-16 | End: 2023-11-17 | Stop reason: HOSPADM

## 2023-11-16 RX ORDER — ROCURONIUM BROMIDE 10 MG/ML
INJECTION, SOLUTION INTRAVENOUS AS NEEDED
Status: DISCONTINUED | OUTPATIENT
Start: 2023-11-16 | End: 2023-11-16

## 2023-11-16 RX ORDER — GLYCOPYRROLATE 0.2 MG/ML
INJECTION INTRAMUSCULAR; INTRAVENOUS AS NEEDED
Status: DISCONTINUED | OUTPATIENT
Start: 2023-11-16 | End: 2023-11-16

## 2023-11-16 RX ORDER — ONDANSETRON HYDROCHLORIDE 2 MG/ML
4 INJECTION, SOLUTION INTRAVENOUS EVERY 8 HOURS PRN
Status: DISCONTINUED | OUTPATIENT
Start: 2023-11-16 | End: 2023-11-17 | Stop reason: HOSPADM

## 2023-11-16 RX ORDER — SODIUM CHLORIDE, SODIUM LACTATE, POTASSIUM CHLORIDE, CALCIUM CHLORIDE 600; 310; 30; 20 MG/100ML; MG/100ML; MG/100ML; MG/100ML
100 INJECTION, SOLUTION INTRAVENOUS CONTINUOUS
Status: DISCONTINUED | OUTPATIENT
Start: 2023-11-16 | End: 2023-11-16 | Stop reason: HOSPADM

## 2023-11-16 RX ORDER — ACETAMINOPHEN 325 MG/1
650 TABLET ORAL EVERY 6 HOURS SCHEDULED
Status: DISCONTINUED | OUTPATIENT
Start: 2023-11-16 | End: 2023-11-17 | Stop reason: HOSPADM

## 2023-11-16 RX ORDER — ASPIRIN 81 MG/1
81 TABLET ORAL 2 TIMES DAILY
Qty: 28 TABLET | Refills: 0 | Status: SHIPPED | OUTPATIENT
Start: 2023-11-16 | End: 2023-11-30

## 2023-11-16 RX ORDER — CEFAZOLIN SODIUM 2 G/100ML
INJECTION, SOLUTION INTRAVENOUS AS NEEDED
Status: DISCONTINUED | OUTPATIENT
Start: 2023-11-16 | End: 2023-11-16

## 2023-11-16 RX ORDER — ONDANSETRON HYDROCHLORIDE 2 MG/ML
4 INJECTION, SOLUTION INTRAVENOUS ONCE AS NEEDED
Status: DISCONTINUED | OUTPATIENT
Start: 2023-11-16 | End: 2023-11-16 | Stop reason: HOSPADM

## 2023-11-16 RX ORDER — SODIUM CHLORIDE 0.9 G/100ML
IRRIGANT IRRIGATION AS NEEDED
Status: DISCONTINUED | OUTPATIENT
Start: 2023-11-16 | End: 2023-11-16 | Stop reason: HOSPADM

## 2023-11-16 RX ORDER — ALBUTEROL SULFATE 0.83 MG/ML
2.5 SOLUTION RESPIRATORY (INHALATION) ONCE AS NEEDED
Status: DISCONTINUED | OUTPATIENT
Start: 2023-11-16 | End: 2023-11-16 | Stop reason: HOSPADM

## 2023-11-16 RX ORDER — POLYETHYLENE GLYCOL 3350 17 G/17G
17 POWDER, FOR SOLUTION ORAL DAILY
Status: DISCONTINUED | OUTPATIENT
Start: 2023-11-16 | End: 2023-11-17 | Stop reason: HOSPADM

## 2023-11-16 RX ORDER — OXYCODONE HYDROCHLORIDE 5 MG/1
10 TABLET ORAL EVERY 4 HOURS PRN
Status: DISCONTINUED | OUTPATIENT
Start: 2023-11-16 | End: 2023-11-17 | Stop reason: HOSPADM

## 2023-11-16 RX ORDER — MIDAZOLAM HYDROCHLORIDE 1 MG/ML
INJECTION INTRAMUSCULAR; INTRAVENOUS AS NEEDED
Status: DISCONTINUED | OUTPATIENT
Start: 2023-11-16 | End: 2023-11-16

## 2023-11-16 RX ADMIN — PROPOFOL 40 MG: 10 INJECTION, EMULSION INTRAVENOUS at 09:57

## 2023-11-16 RX ADMIN — FENTANYL CITRATE 100 MCG: 50 INJECTION, SOLUTION INTRAMUSCULAR; INTRAVENOUS at 09:30

## 2023-11-16 RX ADMIN — ONDANSETRON 4 MG: 2 INJECTION, SOLUTION INTRAMUSCULAR; INTRAVENOUS at 11:28

## 2023-11-16 RX ADMIN — DOCUSATE SODIUM 100 MG: 100 CAPSULE, LIQUID FILLED ORAL at 20:34

## 2023-11-16 RX ADMIN — HYDROMORPHONE HYDROCHLORIDE 0.5 MG: 1 INJECTION, SOLUTION INTRAMUSCULAR; INTRAVENOUS; SUBCUTANEOUS at 12:00

## 2023-11-16 RX ADMIN — CEFAZOLIN SODIUM 3 G: 2 INJECTION, SOLUTION INTRAVENOUS at 09:45

## 2023-11-16 RX ADMIN — Medication 2 L/MIN: at 12:10

## 2023-11-16 RX ADMIN — OXYCODONE HYDROCHLORIDE 10 MG: 5 TABLET ORAL at 18:13

## 2023-11-16 RX ADMIN — Medication 5 MG: at 11:28

## 2023-11-16 RX ADMIN — DEXAMETHASONE SODIUM PHOSPHATE 10 MG: 4 INJECTION, SOLUTION INTRAMUSCULAR; INTRAVENOUS at 09:30

## 2023-11-16 RX ADMIN — LIDOCAINE HYDROCHLORIDE 100 MG: 20 INJECTION, SOLUTION INFILTRATION; PERINEURAL at 09:30

## 2023-11-16 RX ADMIN — SODIUM CHLORIDE, POTASSIUM CHLORIDE, SODIUM LACTATE AND CALCIUM CHLORIDE: 600; 310; 30; 20 INJECTION, SOLUTION INTRAVENOUS at 09:13

## 2023-11-16 RX ADMIN — TRANEXAMIC ACID 1000 MG: 100 INJECTION, SOLUTION INTRAVENOUS at 09:45

## 2023-11-16 RX ADMIN — MIDAZOLAM HYDROCHLORIDE 2 MG: 1 INJECTION, SOLUTION INTRAMUSCULAR; INTRAVENOUS at 09:30

## 2023-11-16 RX ADMIN — PROPOFOL 320 MG: 10 INJECTION, EMULSION INTRAVENOUS at 09:30

## 2023-11-16 RX ADMIN — PROPOFOL 40 MG: 10 INJECTION, EMULSION INTRAVENOUS at 10:41

## 2023-11-16 RX ADMIN — HYDROMORPHONE HYDROCHLORIDE 0.5 MG: 1 INJECTION, SOLUTION INTRAMUSCULAR; INTRAVENOUS; SUBCUTANEOUS at 12:39

## 2023-11-16 RX ADMIN — OXYCODONE HYDROCHLORIDE 5 MG: 5 TABLET ORAL at 13:08

## 2023-11-16 RX ADMIN — CEFAZOLIN 3 G: 10 INJECTION, POWDER, FOR SOLUTION INTRAVENOUS at 20:33

## 2023-11-16 RX ADMIN — Medication 15 MG: at 09:30

## 2023-11-16 RX ADMIN — ROCURONIUM BROMIDE 70 MG: 10 INJECTION, SOLUTION INTRAVENOUS at 09:30

## 2023-11-16 RX ADMIN — GLYCOPYRROLATE 0.2 MG: 0.2 INJECTION INTRAMUSCULAR; INTRAVENOUS at 09:30

## 2023-11-16 RX ADMIN — HYDROMORPHONE HYDROCHLORIDE 0.5 MG: 1 INJECTION, SOLUTION INTRAMUSCULAR; INTRAVENOUS; SUBCUTANEOUS at 11:55

## 2023-11-16 RX ADMIN — GLYCOPYRROLATE 0.6 MG: 0.2 INJECTION INTRAMUSCULAR; INTRAVENOUS at 11:28

## 2023-11-16 SDOH — SOCIAL STABILITY: SOCIAL INSECURITY: DO YOU FEEL ANYONE HAS EXPLOITED OR TAKEN ADVANTAGE OF YOU FINANCIALLY OR OF YOUR PERSONAL PROPERTY?: NO

## 2023-11-16 SDOH — ECONOMIC STABILITY: HOUSING INSECURITY: IN THE LAST 12 MONTHS, HOW MANY PLACES HAVE YOU LIVED?: 2

## 2023-11-16 SDOH — ECONOMIC STABILITY: TRANSPORTATION INSECURITY
IN THE PAST 12 MONTHS, HAS LACK OF TRANSPORTATION KEPT YOU FROM MEETINGS, WORK, OR FROM GETTING THINGS NEEDED FOR DAILY LIVING?: NO

## 2023-11-16 SDOH — ECONOMIC STABILITY: TRANSPORTATION INSECURITY
IN THE PAST 12 MONTHS, HAS THE LACK OF TRANSPORTATION KEPT YOU FROM MEDICAL APPOINTMENTS OR FROM GETTING MEDICATIONS?: NO

## 2023-11-16 SDOH — ECONOMIC STABILITY: FOOD INSECURITY
WITHIN THE PAST 12 MONTHS, YOU WORRIED THAT YOUR FOOD WOULD RUN OUT BEFORE YOU GOT THE MONEY TO BUY MORE.: PATIENT DECLINED

## 2023-11-16 SDOH — ECONOMIC STABILITY: FOOD INSECURITY: WITHIN THE PAST 12 MONTHS, YOU WORRIED THAT YOUR FOOD WOULD RUN OUT BEFORE YOU GOT MONEY TO BUY MORE.: PATIENT DECLINED

## 2023-11-16 SDOH — ECONOMIC STABILITY: HOUSING INSECURITY: IN THE PAST 12 MONTHS HAS THE ELECTRIC, GAS, OIL, OR WATER COMPANY THREATENED TO SHUT OFF SERVICES IN YOUR HOME?: NO

## 2023-11-16 SDOH — SOCIAL STABILITY: SOCIAL INSECURITY: WERE YOU ABLE TO COMPLETE ALL THE BEHAVIORAL HEALTH SCREENINGS?: YES

## 2023-11-16 SDOH — ECONOMIC STABILITY: INCOME INSECURITY: IN THE LAST 12 MONTHS, WAS THERE A TIME WHEN YOU WERE NOT ABLE TO PAY THE MORTGAGE OR RENT ON TIME?: NO

## 2023-11-16 SDOH — SOCIAL STABILITY: SOCIAL INSECURITY: DOES ANYONE TRY TO KEEP YOU FROM HAVING/CONTACTING OTHER FRIENDS OR DOING THINGS OUTSIDE YOUR HOME?: NO

## 2023-11-16 SDOH — ECONOMIC STABILITY: FOOD INSECURITY: WITHIN THE PAST 12 MONTHS, THE FOOD YOU BOUGHT JUST DIDN'T LAST AND YOU DIDN'T HAVE MONEY TO GET MORE.: PATIENT DECLINED

## 2023-11-16 SDOH — ECONOMIC STABILITY: HOUSING INSECURITY
IN THE LAST 12 MONTHS, WAS THERE A TIME WHEN YOU DID NOT HAVE A STEADY PLACE TO SLEEP OR SLEPT IN A SHELTER (INCLUDING NOW)?: NO

## 2023-11-16 SDOH — ECONOMIC STABILITY: TRANSPORTATION INSECURITY

## 2023-11-16 SDOH — ECONOMIC STABILITY: FOOD INSECURITY

## 2023-11-16 SDOH — SOCIAL STABILITY: SOCIAL INSECURITY: HAVE YOU HAD THOUGHTS OF HARMING ANYONE ELSE?: NO

## 2023-11-16 SDOH — ECONOMIC STABILITY: TRANSPORTATION INSECURITY: IN THE PAST 12 MONTHS, HAS LACK OF TRANSPORTATION KEPT YOU FROM MEDICAL APPOINTMENTS OR FROM GETTING MEDICATIONS?: NO

## 2023-11-16 SDOH — ECONOMIC STABILITY: HOUSING INSECURITY: IN THE LAST 12 MONTHS, WAS THERE A TIME WHEN YOU WERE NOT ABLE TO PAY THE MORTGAGE OR RENT ON TIME?: NO

## 2023-11-16 SDOH — SOCIAL STABILITY: SOCIAL INSECURITY: ARE YOU OR HAVE YOU BEEN THREATENED OR ABUSED PHYSICALLY, EMOTIONALLY, OR SEXUALLY BY ANYONE?: NO

## 2023-11-16 SDOH — ECONOMIC STABILITY: HOUSING INSECURITY

## 2023-11-16 SDOH — ECONOMIC STABILITY: GENERAL

## 2023-11-16 SDOH — SOCIAL STABILITY: SOCIAL INSECURITY: ARE THERE ANY APPARENT SIGNS OF INJURIES/BEHAVIORS THAT COULD BE RELATED TO ABUSE/NEGLECT?: NO

## 2023-11-16 SDOH — ECONOMIC STABILITY: FOOD INSECURITY: WITHIN THE PAST 12 MONTHS, THE FOOD YOU BOUGHT JUST DIDN’T LAST AND YOU DIDN’T HAVE MONEY TO GET MORE.: PATIENT DECLINED

## 2023-11-16 SDOH — SOCIAL STABILITY: SOCIAL INSECURITY: ABUSE: ADULT

## 2023-11-16 SDOH — SOCIAL STABILITY: SOCIAL INSECURITY: HAS ANYONE EVER THREATENED TO HURT YOUR FAMILY OR YOUR PETS?: NO

## 2023-11-16 SDOH — HEALTH STABILITY: MENTAL HEALTH: CURRENT SMOKER: 0

## 2023-11-16 SDOH — SOCIAL STABILITY: SOCIAL INSECURITY: DO YOU FEEL UNSAFE GOING BACK TO THE PLACE WHERE YOU ARE LIVING?: NO

## 2023-11-16 ASSESSMENT — PAIN DESCRIPTION - LOCATION
LOCATION: HIP
LOCATION: PELVIS

## 2023-11-16 ASSESSMENT — PAIN DESCRIPTION - ORIENTATION: ORIENTATION: RIGHT;LEFT

## 2023-11-16 ASSESSMENT — COGNITIVE AND FUNCTIONAL STATUS - GENERAL
MOBILITY SCORE: 24
DAILY ACTIVITIY SCORE: 24
PATIENT BASELINE BEDBOUND: NO

## 2023-11-16 ASSESSMENT — PAIN - FUNCTIONAL ASSESSMENT

## 2023-11-16 ASSESSMENT — PAIN SCALES - GENERAL
PAINLEVEL_OUTOF10: 7
PAINLEVEL_OUTOF10: 7
PAINLEVEL_OUTOF10: 8
PAINLEVEL_OUTOF10: 0 - NO PAIN
PAINLEVEL_OUTOF10: 6
PAINLEVEL_OUTOF10: 8
PAINLEVEL_OUTOF10: 6
PAINLEVEL_OUTOF10: 8
PAINLEVEL_OUTOF10: 10 - WORST POSSIBLE PAIN
PAINLEVEL_OUTOF10: 5 - MODERATE PAIN
PAINLEVEL_OUTOF10: 10 - WORST POSSIBLE PAIN
PAINLEVEL_OUTOF10: 8
PAINLEVEL_OUTOF10: 7
PAINLEVEL_OUTOF10: 7
PAINLEVEL_OUTOF10: 5 - MODERATE PAIN
PAINLEVEL_OUTOF10: 0 - NO PAIN

## 2023-11-16 ASSESSMENT — ACTIVITIES OF DAILY LIVING (ADL)
FEEDING YOURSELF: INDEPENDENT
DRESSING YOURSELF: INDEPENDENT
WALKS IN HOME: INDEPENDENT
HEARING - LEFT EAR: FUNCTIONAL
JUDGMENT_ADEQUATE_SAFELY_COMPLETE_DAILY_ACTIVITIES: YES
ADEQUATE_TO_COMPLETE_ADL: YES
GROOMING: INDEPENDENT
TOILETING: INDEPENDENT
PATIENT'S MEMORY ADEQUATE TO SAFELY COMPLETE DAILY ACTIVITIES?: YES
HEARING - RIGHT EAR: FUNCTIONAL
BATHING: INDEPENDENT
LACK_OF_TRANSPORTATION: NO

## 2023-11-16 ASSESSMENT — PAIN DESCRIPTION - DESCRIPTORS: DESCRIPTORS: SHARP

## 2023-11-16 ASSESSMENT — PATIENT HEALTH QUESTIONNAIRE - PHQ9
SUM OF ALL RESPONSES TO PHQ9 QUESTIONS 1 & 2: 0
1. LITTLE INTEREST OR PLEASURE IN DOING THINGS: NOT AT ALL
2. FEELING DOWN, DEPRESSED OR HOPELESS: NOT AT ALL

## 2023-11-16 ASSESSMENT — SOCIAL DETERMINANTS OF HEALTH (SDOH): IN THE PAST 12 MONTHS, HAS THE ELECTRIC, GAS, OIL, OR WATER COMPANY THREATENED TO SHUT OFF SERVICE IN YOUR HOME?: NO

## 2023-11-16 ASSESSMENT — COLUMBIA-SUICIDE SEVERITY RATING SCALE - C-SSRS
2. HAVE YOU ACTUALLY HAD ANY THOUGHTS OF KILLING YOURSELF?: NO
1. IN THE PAST MONTH, HAVE YOU WISHED YOU WERE DEAD OR WISHED YOU COULD GO TO SLEEP AND NOT WAKE UP?: NO
6. HAVE YOU EVER DONE ANYTHING, STARTED TO DO ANYTHING, OR PREPARED TO DO ANYTHING TO END YOUR LIFE?: NO

## 2023-11-16 NOTE — PROGRESS NOTES
Orthopaedic Surgery Progress Note    Subjective:    No acute issues. Pain controlled. Denies N/V. Denies SOB/chest pain. Denies N/T.    Objective:  Vitals:    11/16/23 1615   BP: 150/85   Pulse: 88   Resp: 18   Temp: 36.4 °C (97.5 °F)   SpO2: 96%     Physical Exam    - Constitutional: No acute distress, cooperative  - Eyes: EOM grossly intact  - Head/Neck: Trachea midline  - Respiratory/Thorax: NWOB  - Cardiovascular: RRR on peripheral palpation  - Gastrointestinal: Nondistended  - Psychological: Appropriate mood/behavior  - Skin: Warm and dry. Additional findings in musculoskeletal evaluation  - Musculoskeletal:  ---    Bilateral lower extremity:  - Dressings cdi   - Ambulating without difficulty   - Compartments soft and compressible  - Fires TA/GS/EHL  - SILT in [place]/Sa/SP/DP/T distributions  - Palpable DP pulse        Results for orders placed or performed during the hospital encounter of 11/16/23 (from the past 24 hour(s))   POCT pregnancy, urine   Result Value Ref Range    Preg Test, Ur Negative Negative       FL less than 1 hour   Final Result            Assessment:  33F presenting with painful orthopedic hardware. Initially scheduled for outpatient surgery but patient unable to safely return home given recent anesthesia. Admitted for obs    No other acute issues in the postoperative period     Plan:  - Weight-bearing status: WBAT BL LE  - Feeding: Regular diet as tolerated, bowel regimen  - Analgesia: Multimodal  - Respiratory: Encourage IS, maintain O2 sat >92%  - Infection: Sayda-operative Ancef for 24 hours, afebrile   - Lines: Maintain PIVx2 while inpatient  - Drains: N  - Cruz: N  - Embolic ppx: SCDs, ASA 81mg BID POD1  - Transfusion: Trend CBC, no indication for transfusion  - Cardiac: No issues  - Glycemic: No issues  - C/w home medications  - Dispo: home tomorrow    D/w Dr. Doan     This patient will be followed by the Orthopaedic Trauma service. Please page or Epic Chat the corresponding residents  below with questions or concerns.     Ortho Trauma Service (Epic Chat Preferred)  First call: Luca Cantu MD PGY1  Second call: Cristino Harrington MD PGY2  Third call: Ion Ferguson MD PGY3    6pm-6am M-F, Holidays, and weekends page Ortho on-call @[phone] with urgent questions/concerns.     Cristino Harrington MD  Orthopaedic Surgery PGY-2  On-call pager [phone]

## 2023-11-16 NOTE — PERIOPERATIVE NURSING NOTE
Arabella RN took over care. Chat to [facility] for admission orders for patient.     1700- patient meets criteria to transfer to floor; bed ready and report called.

## 2023-11-16 NOTE — BRIEF OP NOTE
Date: 2023  OR Location: [facility] OR    Name: Alayna Andrea, : 1990, Age: 33 y.o., MRN: [id number], Sex: female    Diagnosis  Pre-op Diagnosis     * Pelvic pain syndrome [R10.2] Post-op Diagnosis     * Pelvic pain syndrome [R10.2]     Procedures  Removal Hardware Pelvis   - MA REMOVAL IMPLANT DEEP      Surgeons      * Mook Doan - Primary    Resident/Fellow/Other Assistant:  Surgeon(s) and Role:     * Cristino Harrington MD - Resident - Assisting  Stefany Mcdowell PAC    Procedure Summary  Anesthesia: General  ASA: III  Anesthesia Staff: Anesthesiologist: Rin Hidalgo MD  C-AA: PATIENCE Estrada; PATIENCE Gomez  Estimated Blood Loss: 15mL  Intra-op Medications: * No intraprocedure medications in log *           Anesthesia Record               Intraprocedure I/O Totals          Intake    Ketamine 0.00 mL    The total shown is the total volume documented since Anesthesia Start was filed.    Propofol Drip 0.00 mL    The total shown is the total volume documented since Anesthesia Start was filed.    lactated Ringer's 800.00 mL    Total Intake 800 mL       Output    Est. Blood Loss 10 mL    Total Output 10 mL       Net    Net Volume 790 mL          Specimen: No specimens collected     Staff:   Circulator: Taylor Duque RN  Scrub Person: Mook Roberts          Findings: see op report    Complications:  None; patient tolerated the procedure well.     Disposition: PACU - hemodynamically stable.  Condition: stable  Specimens Collected: No specimens collected    Plan:  WBAT b/l LE  ASA for DVT ppx  Follow up 2 weeks for post-op check    Stefany Mcdowell PA-C

## 2023-11-16 NOTE — NURSING NOTE
Patient arrived to room 6032 via gurney from the PACU. Patient ambulated from the hallway to the room with assistance of her personal cane under her own power with steady gait.    POC discussed; assessment per flowsheet; all questions and concerns addressed at this time.    Patient resting comfortably in bed without concerns at this time.    All safety precautions in place. Call light given.

## 2023-11-16 NOTE — ANESTHESIA POSTPROCEDURE EVALUATION
Patient: Alayna Andrea    Procedure Summary       Date: 11/16/23 Room / Location: [facility] OR 08 / [facility]    Anesthesia Start: 0910 Anesthesia Stop: 1148    Procedure: Removal Hardware Pelvis (Pelvis) Diagnosis:       Pelvic pain syndrome      (Pelvic pain syndrome [R10.2])    Surgeons: Mook Doan MD Responsible Provider: Rin Hidalgo MD    Anesthesia Type: general ASA Status: 3            Anesthesia Type: general    Vitals Value Taken Time   /88 11/16/23 1330   Temp 36.1 °C (97 °F) 11/16/23 1245   Pulse 70 11/16/23 1336   Resp 16 11/16/23 1330   SpO2 99 % 11/16/23 1336   Vitals shown include unvalidated device data.    Anesthesia Post Evaluation    Patient location during evaluation: PACU  Patient participation: complete - patient participated  Level of consciousness: awake and alert  Pain management: adequate  Airway patency: patent  Cardiovascular status: acceptable  Respiratory status: acceptable  Hydration status: acceptable  Postoperative Nausea and Vomiting: none        No notable events documented.

## 2023-11-16 NOTE — DISCHARGE INSTRUCTIONS
Orthopaedic Surgery Discharge Instructions    Weight bearing status: You may put weight as tolerated on both legs    VTE Prophylaxis (Blood Clot Prevention): Aspirin 81mg twice daily for 4 weeks    Antibiotics: None    Home Medication: Resume all home medications    Resume normal diet     Leave operative dressing in place until post operative day 7. Then remove and leave incision open to air. Let water run freely over incision when showering, do not scrub. Do not soak in pool or tub. Do not swim in pools or ponds until 3 months after surgery.    Call if any drainage after 7 days, increased redness/warmth/swelling at incision site, pain/tenderness of calf, swelling of calf that does not respond to elevation, SOB/chest pain.    Call for any questions or concerns.     MEDICATION SIDE EFFECTS.  OXYCODONE: constipation, nausea, vomiting, upset stomach, (sleepiness), dizziness, lightheadedness, itching, headache, blurred vision, dry mouth, sweating  ASPIRIN:  upset stomach, heartburn; drowsiness; or headache    Follow up with Stefany Mcdowell / Dr. Doan in 2 weeks. Call [phone] to schedule/confirm appointment.

## 2023-11-16 NOTE — OP NOTE
Removal Hardware Pelvis Operative Note     Date: 2023  OR Location: [facility] OR    Name: Alayna Andrea, : 1990, Age: 33 y.o., MRN: [id number], Sex: female    Diagnosis  Pre-op Diagnosis     * Pelvic pain syndrome [R10.2] Post-op Diagnosis     * Pelvic pain syndrome [R10.2]  Painful orthopedic implant  Morbid obesity second Assistant:     Procedures  Removal deep implant left hemipelvis, increased complexity and operative time due to the patient's morbid obesity  Removal deep implant right hemipelvis, increased complexity and operative time due to the patient's morbid obesity      Surgeons      * Mook Doan - Primary    Resident/Fellow/Other Assistant:  Surgeon(s) and Role:  First Assistnat: Stefany Mcdowell PA-C, who was critical and essential as a first assistant as there was no qualified resident available.  Second Assistant: Cristino Harrington MD - Resident - Assisting    Procedure Summary  Anesthesia: General  ASA: III  Anesthesia Staff: Anesthesiologist: Rin Hidalgo MD  C-AA: PATIENCE Estrada; PATIENCE Gomez  Estimated Blood Loss: Please see anesthesia record  Intra-op Medications: * No intraprocedure medications in log *           Anesthesia Record               Intraprocedure I/O Totals          Intake    Ketamine 0.00 mL    The total shown is the total volume documented since Anesthesia Start was filed.    Propofol Drip 0.00 mL    The total shown is the total volume documented since Anesthesia Start was filed.    lactated Ringer's 800.00 mL    Total Intake 800 mL       Output    Est. Blood Loss 10 mL    Total Output 10 mL       Net    Net Volume 790 mL          Specimen: No specimens collected     Staff:   Circulator: Taylor Duque RN  Scrub Person: Mook Roberts         Drains and/or Catheters: * None in log *    Tourniquet Times:         Implants:  Implants       Type Name Action Serial No.      Screw GUIDEWIRE, 2.8 W/FLUTES - [id number] Used, Not Implanted                Findings: Please see below    Indications: Alayna Andrea is an 33 y.o. female who is having surgery for Pelvic pain syndrome [R10.2] and painful orthopedic hardware.  Her case is complicated by morbid obesity.    The patient was seen in the preoperative area. The risks, benefits, complications, treatment options, non-operative alternatives, expected recovery and outcomes were discussed with the patient. The possibilities of reaction to medication, pulmonary aspiration, injury to surrounding structures, bleeding, recurrent infection, the need for additional procedures, failure to diagnose a condition, and creating a complication requiring transfusion or operation were discussed with the patient. The patient concurred with the proposed plan, giving informed consent.  The site of surgery was properly noted/marked if necessary per policy. The patient has been actively warmed in preoperative area. Preoperative antibiotics have been ordered and given within 1 hours of incision. Venous thrombosis prophylaxis have been ordered including bilateral sequential compression devices    Procedure Details: The patient was met in the preop holding area and identified by name and medical record number.  She was positioned prone on a flat Albino table after general anesthesia was induced.  Preoperative antibiotic prophylaxis and transition Gassett was administered.  Preoperative timeout was performed by myself prior to prepping and the correct operative sites were identified the limbs were then prepped and draped you sterile fashion with ChloraPrep.  We first turned our attention to the right hemipelvis.  Her case was complicated by her morbid obesity extremely adding to the difficulty of the case given the depth of the soft tissue envelope.  Also adding to the complexity case was the fact that the screw heads were overgrown with bone.  I was able to further cannulate the more superior screw and use a pituitary rongeur to  remove bone and a small curette to remove the inner bone around the screw.  We then cannulated screw and used a 5.0 hex screwdriver to remove the superior screw.  The inferior screw was actually backed out and encased in bone.  In a similar fashion we used a pituitary rongeur to remove bone around the screw and had to use the universal screw extractor that reversed threaded into the screw to remove it.  I was able to remove the inferior washer with a pituitary rongeur.  The superior washer was fully encased in bone and was not able to be removed.  It was also not prominent or within the sacroiliac joint.  Once the right side was completed we copiously irrigated the wound and closed in layers in a sterile fashion and turned our attention to the left side.  A separate incision on the left side of the pelvis was then made.  We localized the screw head.  Again the soft tissue envelope was quite deep and complexity of the case.  We removed the overgrown bone around the screw and then cannulated screw.  We then had to utilize the universal extractor after the screw was removed but the 5.0 hex screwdriver for half the way and then was stripping at the thread purchase of the iliac cortex.  Screw was then fully removed as well as the washer.  Final x-ray was taken and saved.  The wounds were copiously irrigated and closed in layers in a standard fashion.  I was present for the key and critical aspects of the procedure.    Postoperative plan:    Weightbearing as tolerated bilateral lower extremities.  Multimodal analgesia postoperatively.  DVT prophylaxis with aspirin.  24 hours of oral Keflex for infection prophylaxis.  We will see her back in the office in 2 weeks time for wound check and staple removal with a supine AP pelvis.  Complications:  None; patient tolerated the procedure well.    Disposition: PACU - hemodynamically stable.  Condition: stable         Additional Details: None    Attending Attestation: I was present  and scrubbed for the key portions of the procedure.    Mook Doan  Phone Number: [phone]

## 2023-11-16 NOTE — ANESTHESIA PREPROCEDURE EVALUATION
Patient: Alayna Andrea    Procedure Information       Date/Time: 23 0830    Procedure: Removal Hardware Pelvis (Pelvis)    Location: [facility] OR 08 /  [facility] OR    Surgeons: Mook Doan MD          33-year-old female scheduled for removal of hardware from the pelvis on 2023 for treatment of painful hardware.  PMH of  hypertension, anxiety, depression, anemia, closed pelvic ring fracture with repair with hardware, morbid obesity actively losing weight.     Relevant Problems   Cardiovascular   (+) HTN (hypertension)      Endocrine   (+) Class 3 severe obesity with body mass index (BMI) of 60.0 to 69.9 in adult (CMS/[facility])      Neuro/Psych   (+) Anxiety   (+) Depression      Hematology   (+) Anemia     KG from [facility] in care everywhere tab- NSR with sinus arrhythmia- no acute changes.     Clinical information reviewed:   Tobacco  Allergies  Meds   Med Hx  Surg Hx  OB Status  Fam Hx  Soc   Hx        NPO Detail:  NPO/Void Status  Carbonhydrate Drink Given Prior to Surgery? : N  Date of Last Liquid: 11/15/23  Time of Last Liquid: 0  Date of Last Solid: 11/15/23  Time of Last Solid: 2200  Last Intake Type: Clear fluids  Time of Last Void: 0758         Vitals:    23 0751   BP: 157/79   Pulse: 77   Resp: 15   Temp: 36.6 °C (97.9 °F)   SpO2: 99%       Past Surgical History:   Procedure Laterality Date     SECTION, CLASSIC  2020    CT ANGIO NECK  11/10/2018    CT NECK ANGIO W AND WO IV CONTRAST 11/10/2018 [facility]    CT HEAD ANGIO W AND WO IV CONTRAST  11/10/2018    CT HEAD ANGIO W AND WO IV CONTRAST 11/10/2018 [facility] CLINICAL [facility]GAC[facility]    HIP SURGERY      NASAL SEPTUM SURGERY       Past Medical History:   Diagnosis Date    Acute pain of both hips     Anemia     Anxiety     Bilateral hearing loss     Closed fracture of temporal bone with routine healing, subsequent encounter     Closed pelvic ring fracture with routine healing, subsequent  "encounter     Depression     Hypertension     Impaired gait and mobility     Morbid obesity (CMS/HCC)     Peripheral neuropathy     Vertigo      No current facility-administered medications for this encounter.  Allergies   Allergen Reactions    Shellfish Derived Other and Itching    Enoxaparin Itching and Other     \"My skin was burned up\"    Heparin Other     \"My arm was numb for 3 months\"    House Dust Other    Iodine Itching and Other     \"burning,throw up blood\"    Other reaction(s): Other: See Comments   \"burning,throw up blood\"    Ketorolac Other and Swelling     tongue swelling    \"my gums swelled up and start itching\", tolerates ibuprofen    Mold Other    Other Itching    Procaine Itching     Tolerates lidocaine patches 7/2019     Social History     Tobacco Use    Smoking status: Never    Smokeless tobacco: Never   Substance Use Topics    Alcohol use: Never         Chemistry    Lab Results   Component Value Date/Time     11/13/2023 1044    K 3.9 11/13/2023 1044     11/13/2023 1044    CO2 30 11/13/2023 1044    BUN 11 11/13/2023 1044    CREATININE 0.62 11/13/2023 1044    Lab Results   Component Value Date/Time    CALCIUM 9.3 11/13/2023 1044          Lab Results   Component Value Date/Time    WBC 5.5 11/13/2023 1044    HGB 11.8 (L) 11/13/2023 1044    HCT 37.0 11/13/2023 1044     11/13/2023 1044     Lab Results   Component Value Date/Time    PROTIME 13.7 (H) 11/10/2018 0335    INR 1.2 (H) 11/10/2018 0335     TTE 11/2023  CONCLUSIONS:   1. Left ventricular systolic function is normal with a 60% estimated ejection fraction.           NPO Detail:  NPO/Void Status  Carbonhydrate Drink Given Prior to Surgery? : N  Date of Last Liquid: 11/15/23  Time of Last Liquid: 2200  Date of Last Solid: 11/15/23  Time of Last Solid: 2200  Last Intake Type: Clear fluids  Time of Last Void: 0758         Review of Systems    Physical Exam    Airway  Mallampati: III  TM distance: <3 FB  Neck ROM: full   "   Cardiovascular   Rhythm: regular  Rate: normal     Dental        Pulmonary - normal exam     Abdominal   (+) obese             Anesthesia Plan    ASA 3     general     The patient is not a current smoker.    intravenous induction   Anesthetic plan and risks discussed with patient.  Use of blood products discussed with patient who consented to blood products.    Plan discussed with attending and CAA.

## 2023-11-16 NOTE — HOSPITAL COURSE
33 year-old F who presented with painful orthopedic hardware across bilateral SI joints. Patient is now s/p YASSINE pelvis on 11/16/23 by Dr. Doan. On the day of surgery, patient was identified in the pre-operative holding area and agreeable to proceed with surgery. Written consent was obtained.  Please see operative note for further details of this procedure. Patient received 24 hours of damien-operative antibiotics. Patient recovered in the PACU before transfer to a regular nursing floor. Patient was started on multimodal pain control and ASA 81 mg bid for DVT prophylaxis. She was admitted for observation due to inability to obtain safe ride home on day of surgery. On the day of discharge, patient was afebrile with stable vital signs. Patient was neurovascularly intact at time of discharge. Patient was discharged with prescription of ASA 81 mg bid for DVT prophylaxis for 4 weeks. Patient will follow-up with Dr. Doan in 2 weeks for post-operative visit.

## 2023-11-16 NOTE — H&P
"[facility] Department of Orthopaedic Surgery   Surgical History & Physical >30 Days    Reason for Surgery: Pelvis hardware loosening and irritation  Planned Procedure: Removal of hardware pelvis    History & Physical Reviewed:  I have reviewed the History and Physical for obtained within the last 30 days. Relevant findings and updates are noted below:  No significant changes.    Home medications were reviewed with significant updates noted below:  No significant changes.    Allergies:  Allergies   Allergen Reactions    Shellfish Derived Other and Itching    Enoxaparin Itching and Other     \"My skin was burned up\"    Heparin Other     \"My arm was numb for 3 months\"    House Dust Other    Iodine Itching and Other     \"burning,throw up blood\"    Other reaction(s): Other: See Comments   \"burning,throw up blood\"    Ketorolac Other and Swelling     tongue swelling    \"my gums swelled up and start itching\", tolerates ibuprofen    Mold Other    Other Itching    Procaine Itching     Tolerates lidocaine patches 7/2019       Review of Systems:  Review of Systems   Gen: Denies recent weight loss  Neuro: Denies recent confusion  Ophtho: Denies changes in vision  ENT: Denies changes in hearing  Endo: Denies weight loss/weight gain  CV: Denies chest pain  Resp: Denies shortness of breath  GI: Denies melena/hematochezia  : Denies painful urination  MSK: Per above HPI  Heme: No abnormal bleeding  Psych: Denies hallucinations    ERAS patient?: No    COVID-19 Risk Consent:   Surgeon has reviewed the key risks related to edgar COVID-19 and subsequent sequelae.       11/16/23 at 5:06 AM - Luca Cantu MD   "

## 2023-11-16 NOTE — ANESTHESIA PROCEDURE NOTES
Airway  Date/Time: 11/16/2023 9:35 AM  Urgency: elective    Airway not difficult    Staffing  Performed: PATIENCE   Authorized by: Rin Hidalgo MD    Performed by: PATIENCE Gomez  Patient location during procedure: OR    Indications and Patient Condition  Indications for airway management: anesthesia  Spontaneous Ventilation: absent  Sedation level: deep  Preoxygenated: yes  Patient position: sniffing  MILS maintained throughout  Mask difficulty assessment: 1 - vent by mask    Final Airway Details  Final airway type: endotracheal airway      Successful airway: ETT  Cuffed: yes   Successful intubation technique: video laryngoscopy  Facilitating devices/methods: intubating stylet  Endotracheal tube insertion site: oral  Blade: Parish  Blade size: #4  ETT size (mm): 7.5  Cormack-Lehane Classification: grade IIa - partial view of glottis  Placement verified by: chest auscultation and capnometry   Cuff volume (mL): 6  Measured from: lips  ETT to lips (cm): 22  Number of attempts at approach: 1

## 2023-11-16 NOTE — ANESTHESIA PROCEDURE NOTES
Peripheral IV  Date/Time: 11/16/2023 9:55 AM  Inserted by: PATIENCE Gomez    Placement  Needle size: 18 G  Laterality: right  Location: hand  Local anesthetic: none  Site prep: alcohol  Technique: anatomical landmarks  Attempts: 1

## 2023-11-17 VITALS
DIASTOLIC BLOOD PRESSURE: 61 MMHG | RESPIRATION RATE: 16 BRPM | OXYGEN SATURATION: 91 % | HEART RATE: 115 BPM | HEIGHT: 64 IN | WEIGHT: 293 LBS | BODY MASS INDEX: 50.02 KG/M2 | SYSTOLIC BLOOD PRESSURE: 104 MMHG | TEMPERATURE: 98.2 F

## 2023-11-17 PROCEDURE — 96376 TX/PRO/DX INJ SAME DRUG ADON: CPT

## 2023-11-17 PROCEDURE — G0378 HOSPITAL OBSERVATION PER HR: HCPCS

## 2023-11-17 PROCEDURE — 2500000004 HC RX 250 GENERAL PHARMACY W/ HCPCS (ALT 636 FOR OP/ED): Mod: SE

## 2023-11-17 PROCEDURE — 2500000001 HC RX 250 WO HCPCS SELF ADMINISTERED DRUGS (ALT 637 FOR MEDICARE OP): Mod: SE

## 2023-11-17 RX ADMIN — CEFAZOLIN 3 G: 10 INJECTION, POWDER, FOR SOLUTION INTRAVENOUS at 06:25

## 2023-11-17 RX ADMIN — DOCUSATE SODIUM 100 MG: 100 CAPSULE, LIQUID FILLED ORAL at 08:09

## 2023-11-17 RX ADMIN — BENZOCAINE AND MENTHOL 1 LOZENGE: 15; 3.6 LOZENGE ORAL at 05:43

## 2023-11-17 ASSESSMENT — PAIN SCALES - GENERAL: PAINLEVEL_OUTOF10: 7

## 2023-11-17 ASSESSMENT — PAIN - FUNCTIONAL ASSESSMENT: PAIN_FUNCTIONAL_ASSESSMENT: 0-10

## 2023-11-17 NOTE — CARE PLAN
The patient's goals for the shift include      The clinical goals for the shift include pain control      Problem: Pain  Goal: Takes deep breaths with improved pain control throughout the shift  Outcome: Progressing  Goal: Turns in bed with improved pain control throughout the shift  Outcome: Progressing  Goal: Walks with improved pain control throughout the shift  Outcome: Progressing  Goal: Performs ADL's with improved pain control throughout shift  Outcome: Progressing  Goal: Participates in PT with improved pain control throughout the shift  Outcome: Progressing  Goal: Free from opioid side effects throughout the shift  Outcome: Progressing  Goal: Free from acute confusion related to pain meds throughout the shift  Outcome: Progressing     Problem: Pain  Goal: My pain/discomfort is manageable  Outcome: Progressing     Problem: Safety  Goal: Patient will be injury free during hospitalization  Outcome: Progressing  Goal: I will remain free of falls  Outcome: Progressing     Problem: Daily Care  Goal: Daily care needs are met  Outcome: Progressing     Problem: Psychosocial Needs  Goal: Demonstrates ability to cope with hospitalization/illness  Outcome: Progressing  Goal: Collaborate with me, my family, and caregiver to identify my specific goals  Outcome: Progressing     Problem: Discharge Barriers  Goal: My discharge needs are met  Outcome: Progressing

## 2023-11-17 NOTE — PROGRESS NOTES
Orthopaedic Surgery Progress Note    Subjective:    No acute issues. Pain controlled. Denies N/V. Denies SOB/chest pain. Denies N/T.    Objective:  Vitals:    11/16/23 2318   BP: 136/81   Pulse: 78   Resp: 17   Temp: 36.8 °C (98.2 °F)   SpO2: 96%     Physical Exam    - Constitutional: No acute distress, cooperative  - Eyes: EOM grossly intact  - Head/Neck: Trachea midline  - Respiratory/Thorax: NWOB  - Cardiovascular: RRR on peripheral palpation  - Gastrointestinal: Nondistended  - Psychological: Appropriate mood/behavior  - Skin: Warm and dry. Additional findings in musculoskeletal evaluation  - Musculoskeletal:  ---    Bilateral lower extremity:  - Dressings cdi   - Ambulating without difficulty   - Compartments soft and compressible  - Fires TA/GS/EHL  - SILT in [place]/Sa/SP/DP/T distributions  - Palpable DP pulse        Results for orders placed or performed during the hospital encounter of 11/16/23 (from the past 24 hour(s))   POCT pregnancy, urine   Result Value Ref Range    Preg Test, Ur Negative Negative       FL less than 1 hour   Final Result            Assessment:  33F presenting with painful orthopedic hardware. Initially scheduled for outpatient surgery but patient unable to safely return home given recent anesthesia. Admitted for obs    No other acute issues in the postoperative period     Plan:  - Weight-bearing status: WBAT BL LE  - Feeding: Regular diet as tolerated, bowel regimen  - Analgesia: Multimodal  - Respiratory: Encourage IS, maintain O2 sat >92%  - Infection: Sayda-operative Ancef for 24 hours, afebrile   - Lines: Maintain PIVx2 while inpatient  - Drains: N  - Cruz: N  - Embolic ppx: SCDs, ASA 81mg BID POD1  - Transfusion: Trend CBC, no indication for transfusion  - Cardiac: No issues  - Glycemic: No issues  - C/w home medications  - Dispo: home today    D/w Dr. Doan     This patient will be followed by the Orthopaedic Trauma service. Please page or Epic Chat the corresponding residents  below with questions or concerns.     Ortho Trauma Service (Epic Chat Preferred)  First call: Luca Cantu MD PGY1  Second call: Cristino Harrington MD PGY2  Third call: Ion Ferguson MD PGY3    6pm-6am M-F, Holidays, and weekends page Ortho on-call @[phone] with urgent questions/concerns.     Cristino Harrington MD  Orthopaedic Surgery PGY-2  On-call pager [phone]

## 2023-11-17 NOTE — CARE PLAN
Problem: Pain  Goal: Performs ADL's with improved pain control throughout shift  Outcome: Progressing     Problem: Safety  Goal: I will remain free of falls  Outcome: Progressing     Problem: Daily Care  Goal: Daily care needs are met  Outcome: Progressing     Problem: Psychosocial Needs  Goal: Demonstrates ability to cope with hospitalization/illness  Outcome: Progressing

## 2023-11-17 NOTE — NURSING NOTE
Patient medically stable and cleared for discharge.    Discharge instructions discussed with patient at bedside. Patient educated on follow-up appointments, medication regimen, activity restrictions, wound care/dressing changes, and when to notify the Dr; patient verbalized understanding. All questions and concerns addressed at this time.     Patient stated all personal property is present and accounted for.    AVS printed and handed to the patient.

## 2023-11-17 NOTE — DISCHARGE SUMMARY
Discharge Diagnosis  Painful orthopedic hardware    Issues Requiring Follow-Up  NA    Test Results Pending At Discharge  Pending Labs       No current pending labs.            Hospital Course  33 year-old F who presented with painful orthopedic hardware across bilateral SI joints. Patient is now s/p YASSINE pelvis on 11/16/23 by Dr. Doan. On the day of surgery, patient was identified in the pre-operative holding area and agreeable to proceed with surgery. Written consent was obtained.  Please see operative note for further details of this procedure. Patient received 24 hours of damien-operative antibiotics. Patient recovered in the PACU before transfer to a regular nursing floor. Patient was started on multimodal pain control and ASA 81 mg bid for DVT prophylaxis. She was admitted for observation due to inability to obtain safe ride home on day of surgery. On the day of discharge, patient was afebrile with stable vital signs. Patient was neurovascularly intact at time of discharge. Patient was discharged with prescription of ASA 81 mg bid for DVT prophylaxis for 4 weeks. Patient will follow-up with Dr. Doan in 2 weeks for post-operative visit.      Home Medications     Medication List      START taking these medications     aspirin 81 mg EC tablet; Take 1 tablet (81 mg) by mouth 2 times a day   for 14 days.   cephalexin 500 mg capsule; Commonly known as: Keflex; Take 1 capsule   (500 mg) by mouth 3 times a day for 1 day.   oxyCODONE-acetaminophen 5-325 mg tablet; Commonly known as: Percocet;   Take 1 tablet by mouth every 8 hours if needed for severe pain (7 - 10)   for up to 7 days.   sennosides-docusate sodium 8.6-50 mg tablet; Commonly known as:   Damien-Colace; Take 1 tablet by mouth once daily for 21 days.     CONTINUE taking these medications     ARIPiprazole 5 mg tablet; Commonly known as: Abilify; Take 1 tablet (5   mg) by mouth once daily.   FLUoxetine 10 mg capsule; Commonly known as: PROzac; Take 1 capsule  (10   mg) by mouth once daily.   hydroCHLOROthiazide 25 mg tablet; Commonly known as: HYDRODiuril; Take 1   tablet (25 mg) by mouth once daily.     STOP taking these medications     chlorhexidine 0.12 % solution; Commonly known as: Peridex   chlorhexidine 4 % external liquid; Commonly known as: Hibiclens       Outpatient Follow-Up  Future Appointments   Date Time Provider Department Center   12/11/2023  2:20 PM Marci Dennis PA-C [id number]   1/11/2024  9:30 AM Ravin THOMAS MD [id number] [place]       Cristino Harrington MD

## 2023-12-01 ENCOUNTER — APPOINTMENT (OUTPATIENT)
Dept: ORTHOPEDIC SURGERY | Facility: HOSPITAL | Age: 33
End: 2023-12-01
Payer: COMMERCIAL

## 2023-12-01 PROBLEM — F41.8 DEPRESSION WITH ANXIETY: Status: ACTIVE | Noted: 2018-01-12

## 2023-12-01 PROBLEM — R73.03 PREDIABETES: Status: ACTIVE | Noted: 2018-01-12

## 2023-12-01 PROBLEM — O99.211 OBESITY COMPLICATING PREGNANCY, FIRST TRIMESTER (HHS-HCC): Status: ACTIVE | Noted: 2018-11-19

## 2023-12-01 PROBLEM — G47.33 OSA (OBSTRUCTIVE SLEEP APNEA): Status: ACTIVE | Noted: 2022-12-21

## 2023-12-01 PROBLEM — S32.810D MULTIPLE FRACTURES OF PELVIS WITH STABLE DISRUPTION OF PELVIC CIRCLE WITH ROUTINE HEALING: Status: ACTIVE | Noted: 2018-12-21

## 2023-12-01 PROBLEM — M54.50 LOW BACK PAIN: Status: ACTIVE | Noted: 2023-12-01

## 2023-12-01 PROBLEM — S02.19XA: Status: ACTIVE | Noted: 2018-11-19

## 2023-12-01 PROBLEM — J30.2 CHRONIC SEASONAL ALLERGIC RHINITIS DUE TO FUNGAL SPORES: Status: ACTIVE | Noted: 2018-01-29

## 2023-12-01 PROBLEM — O9A.211: Status: ACTIVE | Noted: 2018-11-19
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
## 2023-12-01 PROBLEM — E11.9 TYPE 2 DIABETES MELLITUS WITHOUT COMPLICATIONS (MULTI): Status: ACTIVE | Noted: 2018-09-10

## 2023-12-01 PROBLEM — Z59.82 LACK OF ACCESS TO TRANSPORTATION: Status: ACTIVE | Noted: 2020-05-14

## 2023-12-01 PROBLEM — J45.901 ASTHMA WITH EXACERBATION (HHS-HCC): Status: ACTIVE | Noted: 2018-12-28

## 2023-12-01 PROBLEM — S32.511A: Status: ACTIVE | Noted: 2018-11-19

## 2023-12-01 PROBLEM — E73.9 LACTOSE INTOLERANCE: Status: ACTIVE | Noted: 2018-01-12

## 2023-12-01 PROBLEM — S32.10XA CLOSED FRACTURE OF SACRUM (MULTI): Status: ACTIVE | Noted: 2018-11-19

## 2023-12-01 PROBLEM — R10.2 PELVIC AND PERINEAL PAIN: Status: ACTIVE | Noted: 2018-11-28

## 2023-12-01 PROBLEM — J34.2 DEVIATED NASAL SEPTUM: Status: ACTIVE | Noted: 2018-01-29

## 2023-12-01 PROBLEM — M79.601 RIGHT ARM PAIN: Status: ACTIVE | Noted: 2018-02-06

## 2023-12-01 PROBLEM — G62.9 PERIPHERAL NEUROPATHY: Status: ACTIVE | Noted: 2023-12-01

## 2023-12-01 PROBLEM — R42 VERTIGO: Status: ACTIVE | Noted: 2023-12-01

## 2023-12-01 PROBLEM — E66.01 MORBID OBESITY (MULTI): Status: ACTIVE | Noted: 2023-12-01

## 2023-12-01 PROBLEM — F31.81 BIPOLAR II DISORDER (MULTI): Status: ACTIVE | Noted: 2018-11-19

## 2023-12-01 PROBLEM — N39.41 URGE INCONTINENCE OF URINE: Status: ACTIVE | Noted: 2019-02-28

## 2023-12-01 PROBLEM — J34.89 NASAL OBSTRUCTION: Status: ACTIVE | Noted: 2022-11-21

## 2023-12-01 PROBLEM — E28.2 PCOS (POLYCYSTIC OVARIAN SYNDROME): Status: ACTIVE | Noted: 2018-01-12

## 2023-12-01 PROBLEM — M79.604 RIGHT LEG PAIN: Status: ACTIVE | Noted: 2023-12-01

## 2023-12-01 PROBLEM — G62.9 POLYNEUROPATHY: Status: ACTIVE | Noted: 2018-10-24

## 2023-12-01 PROBLEM — O99.341: Status: ACTIVE | Noted: 2018-11-19

## 2023-12-01 PROBLEM — M25.552 LEFT HIP PAIN: Status: ACTIVE | Noted: 2023-12-01

## 2023-12-01 PROBLEM — S06.9X9A: Status: ACTIVE | Noted: 2018-11-19

## 2023-12-01 PROBLEM — S32.82XA: Status: ACTIVE | Noted: 2018-11-28

## 2023-12-01 PROBLEM — S32.810D: Status: ACTIVE | Noted: 2023-12-01

## 2023-12-01 PROBLEM — G89.29 CHRONIC PAIN: Status: ACTIVE | Noted: 2018-03-02

## 2023-12-01 PROBLEM — H91.93 BILATERAL HEARING LOSS: Status: ACTIVE | Noted: 2023-12-01

## 2023-12-01 PROBLEM — E11.41 DIABETIC MONONEUROPATHY ASSOCIATED WITH TYPE 2 DIABETES MELLITUS (MULTI): Status: ACTIVE | Noted: 2019-03-05

## 2023-12-01 PROBLEM — E66.1: Status: ACTIVE | Noted: 2017-12-07

## 2023-12-01 PROBLEM — K43.9 VENTRAL HERNIA: Status: ACTIVE | Noted: 2017-10-03

## 2023-12-01 PROBLEM — E66.813: Status: ACTIVE | Noted: 2017-12-07

## 2023-12-01 PROBLEM — J34.3 HYPERTROPHY OF BOTH INFERIOR NASAL TURBINATES: Status: ACTIVE | Noted: 2022-11-21

## 2023-12-01 PROBLEM — M25.551 RIGHT HIP PAIN: Status: ACTIVE | Noted: 2023-12-01

## 2023-12-01 PROBLEM — O03.9: Status: ACTIVE | Noted: 2018-11-19

## 2023-12-01 PROBLEM — R26.89 IMPAIRED GAIT AND MOBILITY: Status: ACTIVE | Noted: 2023-12-01

## 2023-12-01 PROBLEM — S22.41XA MULTIPLE FRACTURES OF RIBS, RIGHT SIDE, INIT FOR CLOS FX: Status: ACTIVE | Noted: 2018-11-19

## 2023-12-01 PROBLEM — K42.9 UMBILICAL HERNIA: Status: ACTIVE | Noted: 2018-11-06

## 2023-12-04 ENCOUNTER — OFFICE VISIT (OUTPATIENT)
Dept: OTOLARYNGOLOGY | Facility: CLINIC | Age: 33
End: 2023-12-04
Payer: MEDICARE

## 2023-12-04 VITALS
HEIGHT: 64 IN | TEMPERATURE: 98.1 F | RESPIRATION RATE: 18 BRPM | BODY MASS INDEX: 50.02 KG/M2 | DIASTOLIC BLOOD PRESSURE: 89 MMHG | OXYGEN SATURATION: 97 % | SYSTOLIC BLOOD PRESSURE: 144 MMHG | WEIGHT: 293 LBS | HEART RATE: 89 BPM

## 2023-12-04 DIAGNOSIS — J34.89 NASAL OBSTRUCTION: Primary | ICD-10-CM

## 2023-12-04 DIAGNOSIS — H91.91 HEARING LOSS OF RIGHT EAR, UNSPECIFIED HEARING LOSS TYPE: ICD-10-CM

## 2023-12-04 DIAGNOSIS — R09.81 CHRONIC NASAL CONGESTION: ICD-10-CM

## 2023-12-04 PROCEDURE — 3079F DIAST BP 80-89 MM HG: CPT | Performed by: STUDENT IN AN ORGANIZED HEALTH CARE EDUCATION/TRAINING PROGRAM

## 2023-12-04 PROCEDURE — 3077F SYST BP >= 140 MM HG: CPT | Performed by: STUDENT IN AN ORGANIZED HEALTH CARE EDUCATION/TRAINING PROGRAM

## 2023-12-04 PROCEDURE — 99204 OFFICE O/P NEW MOD 45 MIN: CPT | Performed by: STUDENT IN AN ORGANIZED HEALTH CARE EDUCATION/TRAINING PROGRAM

## 2023-12-04 PROCEDURE — 3008F BODY MASS INDEX DOCD: CPT | Performed by: STUDENT IN AN ORGANIZED HEALTH CARE EDUCATION/TRAINING PROGRAM

## 2023-12-04 PROCEDURE — 1036F TOBACCO NON-USER: CPT | Performed by: STUDENT IN AN ORGANIZED HEALTH CARE EDUCATION/TRAINING PROGRAM

## 2023-12-04 PROCEDURE — 99214 OFFICE O/P EST MOD 30 MIN: CPT | Performed by: STUDENT IN AN ORGANIZED HEALTH CARE EDUCATION/TRAINING PROGRAM

## 2023-12-04 RX ORDER — BENZOCAINE .13; .15; .5; 2 G/100G; G/100G; G/100G; G/100G
2 GEL ORAL DAILY
Qty: 8.6 G | Refills: 11 | Status: SHIPPED | OUTPATIENT
Start: 2023-12-04 | End: 2024-03-04 | Stop reason: ALTCHOICE

## 2023-12-04 ASSESSMENT — COLUMBIA-SUICIDE SEVERITY RATING SCALE - C-SSRS
2. HAVE YOU ACTUALLY HAD ANY THOUGHTS OF KILLING YOURSELF?: NO
6. HAVE YOU EVER DONE ANYTHING, STARTED TO DO ANYTHING, OR PREPARED TO DO ANYTHING TO END YOUR LIFE?: NO
1. IN THE PAST MONTH, HAVE YOU WISHED YOU WERE DEAD OR WISHED YOU COULD GO TO SLEEP AND NOT WAKE UP?: NO

## 2023-12-04 ASSESSMENT — ENCOUNTER SYMPTOMS
OCCASIONAL FEELINGS OF UNSTEADINESS: 0
LOSS OF SENSATION IN FEET: 0
DEPRESSION: 0

## 2023-12-04 ASSESSMENT — PAIN SCALES - GENERAL: PAINLEVEL: 0-NO PAIN
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
## 2023-12-05 ENCOUNTER — ANCILLARY PROCEDURE (OUTPATIENT)
Dept: RADIOLOGY | Facility: CLINIC | Age: 33
End: 2023-12-05
Payer: COMMERCIAL

## 2023-12-05 ENCOUNTER — OFFICE VISIT (OUTPATIENT)
Dept: ORTHOPEDIC SURGERY | Facility: CLINIC | Age: 33
End: 2023-12-05
Payer: COMMERCIAL

## 2023-12-05 DIAGNOSIS — G89.29 HIP PAIN, CHRONIC, RIGHT: Primary | ICD-10-CM

## 2023-12-05 DIAGNOSIS — M25.551 HIP PAIN, CHRONIC, RIGHT: Primary | ICD-10-CM

## 2023-12-05 DIAGNOSIS — M25.551 HIP PAIN, CHRONIC, RIGHT: ICD-10-CM

## 2023-12-05 DIAGNOSIS — G89.29 HIP PAIN, CHRONIC, RIGHT: ICD-10-CM

## 2023-12-05 DIAGNOSIS — T84.84XA PAINFUL ORTHOPAEDIC HARDWARE (CMS-HCC): ICD-10-CM

## 2023-12-05 PROCEDURE — 3008F BODY MASS INDEX DOCD: CPT | Performed by: PHYSICIAN ASSISTANT

## 2023-12-05 PROCEDURE — 72190 X-RAY EXAM OF PELVIS: CPT

## 2023-12-05 PROCEDURE — 72190 X-RAY EXAM OF PELVIS: CPT | Performed by: RADIOLOGY

## 2023-12-05 PROCEDURE — 99024 POSTOP FOLLOW-UP VISIT: CPT | Performed by: PHYSICIAN ASSISTANT

## 2023-12-05 PROCEDURE — 1036F TOBACCO NON-USER: CPT | Performed by: PHYSICIAN ASSISTANT

## 2023-12-05 ASSESSMENT — PAIN - FUNCTIONAL ASSESSMENT: PAIN_FUNCTIONAL_ASSESSMENT: NO/DENIES PAIN

## 2023-12-05 NOTE — LETTER
December 5, 2023     Patient: Alayna Andrea   YOB: 1990   Date of Visit: 12/5/2023       To Whom It May Concern:    Alayna Andrea underwent surgery on 11/16/23 to removal hardware from her pelvis. She was seen in the office today and recovering well from her surgery. I will refer her to therapy for strengthening/conditioning and to help with overall mobility. Alayna will follow up in 3 months.     If you have any questions or concerns, please don't hesitate to call.         Sincerely,        Stefany Mcdowell PA-C  T: [phone]    CC: No Recipients

## 2023-12-06 NOTE — PROGRESS NOTES
History of Present Illness   Alayna Andrea is a 33 y.o. female presenting today for post-op check from removal of b/l SI screws on 23. Overall doing ok. Has mild pain that is not requiring narcotics for pain control. Rates pain a 2/10. Overall feels much better since having the screws out and feels her mobility is improved.  Incisions are well healing without redness or drainage. Compliant with WB recommendations. Denies numbness, tingling, f/c, CP, SOB or any other complaints or concerns.      Past Medical History:   Diagnosis Date    Acute pain of both hips     Anemia     Anxiety     Bilateral hearing loss     Closed fracture of temporal bone with routine healing, subsequent encounter     Closed pelvic ring fracture with routine healing, subsequent encounter     Depression     Hypertension     Impaired gait and mobility     Morbid obesity (CMS/HCC)     Peripheral neuropathy     Vertigo        Medication Documentation Review Audit       Reviewed by Stefany Mcdowell PA-C (Physician Assistant) on 23 at 1443      Medication Order Taking? Sig Documenting Provider Last Dose Status   ARIPiprazole (Abilify) 5 mg tablet [id number] No Take 1 tablet (5 mg) by mouth once daily. BIRD Garcia Taking Active   aspirin 81 mg EC tablet [id number]  Take 1 tablet (81 mg) by mouth 2 times a day for 14 days. Stefany Mcdowell PA-C   23 2359   budesonide (Rhinocort AQ) 32 mcg/actuation nasal spray [id number]  Administer 2 sprays into each nostril once daily. Ino Warner MD  Active   FLUoxetine (PROzac) 10 mg capsule [id number] No Take 1 capsule (10 mg) by mouth once daily. BIRD Garcia Taking Active   hydroCHLOROthiazide (HYDRODiuril) 25 mg tablet [id number] No Take 1 tablet (25 mg) by mouth once daily. BIRD Garcia Taking Active   sennosides-docusate sodium (Sayda-Colace) 8.6-50 mg tablet [id number] No Take 1 tablet by mouth once daily for 21  "days. Stefany Mcdowell PA-C Taking Active                    Allergies   Allergen Reactions    Shellfish Derived Other and Itching    Enoxaparin Itching and Other     \"My skin was burned up\"    Heparin Other     \"My arm was numb for 3 months\"    House Dust Other    Iodine Itching and Other     \"burning,throw up blood\"    Other reaction(s): Other: See Comments   \"burning,throw up blood\"    Ketorolac Other and Swelling     tongue swelling    \"my gums swelled up and start itching\", tolerates ibuprofen    Mold Other    Other Itching    Procaine Itching     Tolerates lidocaine patches 2019       Social History     Socioeconomic History    Marital status: Single     Spouse name: Not on file    Number of children: Not on file    Years of education: Not on file    Highest education level: Not on file   Occupational History    Not on file   Tobacco Use    Smoking status: Never    Smokeless tobacco: Never   Vaping Use    Vaping Use: Never used   Substance and Sexual Activity    Alcohol use: Never    Drug use: Never    Sexual activity: Not Currently     Partners: Male     Birth control/protection: Abstinence   Other Topics Concern    Not on file   Social History Narrative    Not on file     Social Determinants of Health     Financial Resource Strain: Not on file   Food Insecurity: Not on file   Transportation Needs: Not on file   Physical Activity: Not on file   Stress: Not on file   Social Connections: Not on file   Intimate Partner Violence: Not on file   Housing Stability: Low Risk  (2023)    Housing Stability Vital Sign     Unable to Pay for Housing in the Last Year: No     Number of Places Lived in the Last Year: 2     Unstable Housing in the Last Year: No       Past Surgical History:   Procedure Laterality Date     SECTION, CLASSIC  2020    CT ANGIO NECK  11/10/2018    CT NECK ANGIO W AND WO IV CONTRAST 11/10/2018 [facility] CLINICAL LEGACY    CT HEAD ANGIO W AND WO IV CONTRAST  11/10/2018    CT HEAD ANGIO " W AND WO IV CONTRAST 11/10/2018 [facility] CLINICAL LEGACY    HIP SURGERY      NASAL SEPTUM SURGERY            Review of Systems:  30 point ROS reviewed and negative other than as listed in the HPI     Physical Exam:  Gen: The pt is A&Ox3, NAD, and appear state age and weight  Psychiatric: mood and affect are appropriate   Eyes: sclera are white, EOM grossly intact  ENT: MMM  Neck: supple, thyroid is midline  Respiratory: respirations are nonlabored, chest rise symmetric  CV: rate is regular by palpation of distal pulses  Abdomen: nondistended   Integument: no obvious cutaneous lesions noted. No signs of lymphangitis. No signs of systemic edema.   MSK:  b/l posterior pelvis  surgical incision well healing without edema, erythema, drainage, or other s/sx of infection. Appropriately tender to palpation around the incision. SILT throughout the leg intact. Intact plantarflexion and dorsiflexion. Foot warm and well perfused.      Imaging:  I personally reviewed multiple views of the  pelvis  were obtained in the office today demonstrate maintenance of reduction s/p removal of the hardware     Assessment   33 y.o. female post-op from removal of b/l SI screws on 11/16/23.     Plan:  Continue WBAT on  b/l LE; no restrictions at this time and can resume activity as tolerated.   Incisions well healing; sutures/staples removed in office and steri's placed with wound care instructed  Continue DVT ppx and vit d/calcium for bone health  Referral for therapy placed      Follow-up 3 months with  WB AP pelvis and inlet/outlet  views of the pelvis     All of the patient's questions/concerns address and they are in agreement with the plan.

## 2023-12-11 ENCOUNTER — APPOINTMENT (OUTPATIENT)
Dept: PLASTIC SURGERY | Facility: CLINIC | Age: 33
End: 2023-12-11
Payer: COMMERCIAL

## 2023-12-22 ENCOUNTER — APPOINTMENT (OUTPATIENT)
Dept: ORTHOPEDIC SURGERY | Facility: HOSPITAL | Age: 33
End: 2023-12-22
Payer: COMMERCIAL

## 2024-01-09 ENCOUNTER — EVALUATION (OUTPATIENT)
Dept: PHYSICAL THERAPY | Facility: CLINIC | Age: 34
End: 2024-01-09
Payer: COMMERCIAL

## 2024-01-09 DIAGNOSIS — T84.84XA PAINFUL ORTHOPAEDIC HARDWARE (CMS-HCC): ICD-10-CM

## 2024-01-09 DIAGNOSIS — M54.50 LOW BACK PAIN: ICD-10-CM

## 2024-01-09 DIAGNOSIS — M25.552 LEFT HIP PAIN: ICD-10-CM

## 2024-01-09 DIAGNOSIS — M25.551 RIGHT HIP PAIN: Primary | ICD-10-CM

## 2024-01-09 PROCEDURE — 97110 THERAPEUTIC EXERCISES: CPT | Mod: GP

## 2024-01-09 PROCEDURE — 97161 PT EVAL LOW COMPLEX 20 MIN: CPT | Mod: GP

## 2024-01-09 ASSESSMENT — PAIN SCALES - GENERAL: PAINLEVEL_OUTOF10: 6

## 2024-01-09 ASSESSMENT — ENCOUNTER SYMPTOMS
LOSS OF SENSATION IN FEET: 0
OCCASIONAL FEELINGS OF UNSTEADINESS: 0
DEPRESSION: 0
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
## 2024-01-11 ENCOUNTER — APPOINTMENT (OUTPATIENT)
Dept: PRIMARY CARE | Facility: CLINIC | Age: 34
End: 2024-01-11
Payer: COMMERCIAL

## 2024-01-15 ENCOUNTER — TREATMENT (OUTPATIENT)
Dept: PHYSICAL THERAPY | Facility: CLINIC | Age: 34
End: 2024-01-15
Payer: COMMERCIAL

## 2024-01-15 DIAGNOSIS — M25.552 LEFT HIP PAIN: ICD-10-CM

## 2024-01-15 DIAGNOSIS — M25.551 RIGHT HIP PAIN: Primary | ICD-10-CM

## 2024-01-15 PROCEDURE — 97113 AQUATIC THERAPY/EXERCISES: CPT | Mod: GP

## 2024-01-15 ASSESSMENT — PAIN SCALES - GENERAL: PAINLEVEL_OUTOF10: 7

## 2024-01-15 NOTE — PROGRESS NOTES
Physical Therapy Treatment    Patient Name: Alayna Andrea  MRN: [id number]  Today's Date: 1/15/2024  Time Calculation  Start Time: 1100  Stop Time: 1145  Time Calculation (min): 45 min    Insurance:   Visit number: 2  Approved number of visits: 30  Insurance: Jose R    Current Problem  1. Right hip pain        2. Left hip pain            General  Reason for Referral: SI screw remova  Referred By: jah  Precautions  Precautions  Precautions Comment: none  Pain  Pain Score: 7    Subjective:   Subjective   Patient reports having a lot of cracking in the L side of back lately.  Pain about 7/10.     Compliant with HEP? yes    Objective:   Objective   Waddle gait    Treatments:   Aquatic Physical Therapy (13915):    Water Temp: 92*  Water Depth: 4 feet    Ambulation:  - forward/backward x 5'  - sidestepping x 5'    Standing Exercises:  - Marching in place x 5'  - Alternating hip abduction x 4'  - Hip flexion/extension x 4' R/L  - Heel Raises x 4'  - Minisquats x 4'  - alternating ham curl 4'    Stretches:  - HS stretches on step x 1' R/L      Assessment: a bit fearful at first but able to move more comfortably as the session went on.         Plan: continue water therapy

## 2024-01-18 ENCOUNTER — PATIENT MESSAGE (OUTPATIENT)
Dept: ORTHOPEDIC SURGERY | Facility: CLINIC | Age: 34
End: 2024-01-18
Payer: COMMERCIAL

## 2024-01-18 DIAGNOSIS — G89.29 CHRONIC LOW BACK PAIN, UNSPECIFIED BACK PAIN LATERALITY, UNSPECIFIED WHETHER SCIATICA PRESENT: ICD-10-CM

## 2024-01-18 DIAGNOSIS — M54.50 CHRONIC LOW BACK PAIN, UNSPECIFIED BACK PAIN LATERALITY, UNSPECIFIED WHETHER SCIATICA PRESENT: ICD-10-CM

## 2024-01-24 ENCOUNTER — APPOINTMENT (OUTPATIENT)
Dept: PHYSICAL THERAPY | Facility: CLINIC | Age: 34
End: 2024-01-24
Payer: COMMERCIAL

## 2024-01-25 ENCOUNTER — APPOINTMENT (OUTPATIENT)
Dept: PHYSICAL THERAPY | Facility: CLINIC | Age: 34
End: 2024-01-25
Payer: COMMERCIAL

## 2024-01-31 ENCOUNTER — TREATMENT (OUTPATIENT)
Dept: PHYSICAL THERAPY | Facility: CLINIC | Age: 34
End: 2024-01-31
Payer: COMMERCIAL

## 2024-01-31 DIAGNOSIS — M25.551 RIGHT HIP PAIN: Primary | ICD-10-CM

## 2024-01-31 DIAGNOSIS — M25.552 LEFT HIP PAIN: ICD-10-CM

## 2024-01-31 PROCEDURE — 97113 AQUATIC THERAPY/EXERCISES: CPT | Mod: GP

## 2024-01-31 ASSESSMENT — PAIN SCALES - GENERAL: PAINLEVEL_OUTOF10: 6

## 2024-01-31 NOTE — PROGRESS NOTES
Physical Therapy Treatment    Patient Name: Alayna Andrea  MRN: [id number]  Today's Date: 1/31/2024  Time Calculation  Start Time: 1549  Stop Time: 1645  Time Calculation (min): 56 min    Insurance:   Visit number: 2  Approved number of visits: 30  Insurance: Jose R    Current Problem  1. Right hip pain        2. Left hip pain            General  Reason for Referral: SI screw remova  Referred By: jah  Precautions  Precautions  Precautions Comment: none  Pain  Pain Score: 6    Subjective:   Subjective   Patient reports some hip pain this morning doing ok now.      Compliant with HEP? yes    Objective:   Objective       Treatments:   Aquatic Physical Therapy (65786):    Water Temp: 92*  Water Depth: 4 feet    Ambulation:  - forward/backward x 5'  - sidestepping x 5'    Standing Exercises:  - Marching in place x 5'  - Alternating hip abduction x 4'  - Hip flexion/extension x 3' R/L  - Heel Raises x 4'  - Minisquats x 3'  - alternating ham curl 4'    Stretches:  - HS stretches on step x 1' R/L    Deep water 7 ft with noodle:  - hang x 3'  - bicycles x 4'  - skiing x 3'      Assessment: challenged with deep water and maintaining upright balance and posture.        Plan: water based therapy

## 2024-02-05 ENCOUNTER — OFFICE VISIT (OUTPATIENT)
Dept: DERMATOLOGY | Facility: CLINIC | Age: 34
End: 2024-02-05
Payer: COMMERCIAL

## 2024-02-05 DIAGNOSIS — L73.2 HIDRADENITIS SUPPURATIVA: Primary | ICD-10-CM

## 2024-02-05 DIAGNOSIS — L85.3 XEROSIS CUTIS: ICD-10-CM

## 2024-02-05 DIAGNOSIS — L21.9 SEBORRHEIC DERMATITIS: ICD-10-CM

## 2024-02-05 PROCEDURE — 3008F BODY MASS INDEX DOCD: CPT | Performed by: DERMATOLOGY

## 2024-02-05 PROCEDURE — 1036F TOBACCO NON-USER: CPT | Performed by: DERMATOLOGY

## 2024-02-05 PROCEDURE — 99204 OFFICE O/P NEW MOD 45 MIN: CPT | Performed by: DERMATOLOGY

## 2024-02-05 RX ORDER — KETOCONAZOLE 20 MG/G
CREAM TOPICAL 2 TIMES DAILY
Qty: 60 G | Refills: 11 | Status: SHIPPED | OUTPATIENT
Start: 2024-02-05 | End: 2025-02-04

## 2024-02-05 RX ORDER — BENZOYL PEROXIDE 50 MG/ML
1 LIQUID TOPICAL DAILY
Qty: 227 G | Refills: 11 | Status: SHIPPED | OUTPATIENT
Start: 2024-02-05

## 2024-02-05 RX ORDER — CLINDAMYCIN PHOSPHATE 10 UG/ML
LOTION TOPICAL 2 TIMES DAILY
Qty: 60 ML | Refills: 11 | Status: SHIPPED | OUTPATIENT
Start: 2024-02-05 | End: 2025-02-04

## 2024-02-05 ASSESSMENT — DERMATOLOGY PATIENT ASSESSMENT
ARE YOU TRYING TO GET PREGNANT: NO
DO YOU HAVE ANY NEW OR CHANGING LESIONS: NO
ARE YOU ON BIRTH CONTROL: NO
DO YOU HAVE IRREGULAR MENSTRUAL CYCLES: NO
DO YOU USE A TANNING BED: NO

## 2024-02-05 ASSESSMENT — DERMATOLOGY QUALITY OF LIFE (QOL) ASSESSMENT: ARE THERE EXCLUSIONS OR EXCEPTIONS FOR THE QUALITY OF LIFE ASSESSMENT: NO

## 2024-02-05 NOTE — PROGRESS NOTES
"Divina Andrea \"Naveed\" is a 34 y.o. female who presents for the following: Rash.  She states she has been breaking out with \"boils\" underneath her arms and her breasts for the past several months.  She also notes dry, flaky skin on her face.    Review of Systems:  No other skin or systemic complaints other than what is documented elsewhere in the note.    The following portions of the chart were reviewed this encounter and updated as appropriate:       Skin Cancer History  No skin cancer on file.    Specialty Problems          Dermatology Problems    Hidradenitis suppurativa       Past Dermatologic / Past Relevant Medical History:    No history of skin cancer    Family History:    No family history of melanoma or skin cancer    Social History:    The patient states she has lost a significant amount of weight recently    Allergies:  Shellfish derived, Enoxaparin, Heparin, House dust, Iodine, Ketorolac, Mold, Other, and Procaine    Current Medications / CAM's:    Current Outpatient Medications:     ARIPiprazole (Abilify) 5 mg tablet, Take 1 tablet (5 mg) by mouth once daily., Disp: 90 tablet, Rfl: 1    benzoyl peroxide 5 % external wash, Apply 1 Application topically once daily. In the morning, Disp: 227 g, Rfl: 11    budesonide (Rhinocort AQ) 32 mcg/actuation nasal spray, Administer 2 sprays into each nostril once daily., Disp: 8.6 g, Rfl: 11    clindamycin (Cleocin T) 1 % lotion, Apply topically 2 times a day., Disp: 60 mL, Rfl: 11    FLUoxetine (PROzac) 10 mg capsule, Take 1 capsule (10 mg) by mouth once daily., Disp: 90 capsule, Rfl: 1    hydroCHLOROthiazide (HYDRODiuril) 25 mg tablet, Take 1 tablet (25 mg) by mouth once daily., Disp: 90 tablet, Rfl: 2    ketoconazole (NIZOral) 2 % cream, Apply topically 2 times a day. To affected areas of face, Disp: 60 g, Rfl: 11     Objective   Well appearing patient in no apparent distress; mood and affect are within normal limits.    A waist-up examination was " performed including scalp, face, eyes, ears, nose, lips, neck, chest, axillae, abdomen, back, and bilateral upper extremities. All findings within normal limits unless otherwise noted below.        Assessment/Plan   1. Hidradenitis suppurativa  Left Axilla  Scattered on the patient's axillae and inframammary folds, there are multiple open comedones; several follicular-based erythematous, inflammatory papules as well as a few small, cystic-appearing nodules; and multiple hyperpigmented macules and patches consistent with post-inflammatory hyperpigmentation    Hidradenitis Suppurativa -bilateral axillae and infra-mammary folds.  The potentially chronic and intermittently flaring nature of this inflammatory condition and treatment options, including topical therapy, oral antibiotics, other systemic therapy, such as with Humira, as well as surgical treatment options, were discussed extensively with the patient today.  At this time, I recommend combination topical therapy with Benzoyl Peroxide 5% creamy wash, which the patient was instructed to use to wash the affected areas once or twice daily, and Clindamycin 1% lotion, which the patient was instructed to apply twice daily to the affected areas or up to 3-4 times per day following application of warm compresses as needed for active lesions.  The risks, benefits, and side effects of these medications, including dryness and irritation with BP use, were discussed, and she was instructed to discontinue use of the BP wash should she become or attempt to become pregnant at any time in the future.  Lastly, I discussed the association of this condition with cigarette smoking and weight and encouraged smoking cessation and weight loss, which will likely help improve this condition.  She expressed understanding and is in agreement with this plan.    benzoyl peroxide 5 % external wash - Left Axilla  Apply 1 Application topically once daily. In the morning    clindamycin (Cleocin  T) 1 % lotion - Left Axilla  Apply topically 2 times a day.    2. Seborrheic dermatitis  Head - Anterior (Face)  On the patient's face, mainly the glabella and bilateral eyebrows and perinasal creases, there are pink, scaly patches with whitish-yellowish, greasy scale    Seborrheic Dermatitis - face.  The potentially chronic and intermittently flaring nature of this condition and treatment options were discussed extensively with the patient today.  At this time, I recommend topical anti-fungal therapy with Ketoconazole 2% cream, which the patient was instructed to apply twice daily to the affected areas of the face.  The risks, benefits, and side effects of this medication were discussed.  The patient expressed understanding and is in agreement with this plan.    ketoconazole (NIZOral) 2 % cream - Head - Anterior (Face)  Apply topically 2 times a day. To affected areas of face    3. Xerosis cutis  Diffuse generalized dry, scaly skin.    Xerosis.  I emphasized the importance of dry, sensitive skin care, including the use of a mild soap, such as Dove, and frequent and aggressive moisturization, at least twice daily and immediately following showers or baths, with recommended over-the-counter moisturizing creams, such as Eucerin, Cetaphil, Cerave, or Aveeno, or Vaseline or Aquaphor ointments.

## 2024-02-06 ENCOUNTER — OFFICE VISIT (OUTPATIENT)
Dept: ORTHOPEDIC SURGERY | Facility: CLINIC | Age: 34
End: 2024-02-06
Payer: COMMERCIAL

## 2024-02-06 ENCOUNTER — HOSPITAL ENCOUNTER (OUTPATIENT)
Dept: RADIOLOGY | Facility: CLINIC | Age: 34
Discharge: HOME | End: 2024-02-06
Payer: COMMERCIAL

## 2024-02-06 DIAGNOSIS — T84.84XA PAINFUL ORTHOPAEDIC HARDWARE (CMS-HCC): Primary | ICD-10-CM

## 2024-02-06 DIAGNOSIS — T84.84XA PAINFUL ORTHOPAEDIC HARDWARE (CMS-HCC): ICD-10-CM

## 2024-02-06 PROCEDURE — 99024 POSTOP FOLLOW-UP VISIT: CPT | Performed by: PHYSICIAN ASSISTANT

## 2024-02-06 PROCEDURE — 72190 X-RAY EXAM OF PELVIS: CPT

## 2024-02-06 PROCEDURE — 3008F BODY MASS INDEX DOCD: CPT | Performed by: PHYSICIAN ASSISTANT

## 2024-02-06 PROCEDURE — 1036F TOBACCO NON-USER: CPT | Performed by: PHYSICIAN ASSISTANT

## 2024-02-06 PROCEDURE — 72190 X-RAY EXAM OF PELVIS: CPT | Performed by: RADIOLOGY

## 2024-02-06 ASSESSMENT — PAIN - FUNCTIONAL ASSESSMENT: PAIN_FUNCTIONAL_ASSESSMENT: 0-10

## 2024-02-06 ASSESSMENT — PAIN SCALES - GENERAL: PAINLEVEL_OUTOF10: 5 - MODERATE PAIN

## 2024-02-07 ENCOUNTER — TELEPHONE (OUTPATIENT)
Dept: DERMATOLOGY | Facility: CLINIC | Age: 34
End: 2024-02-07
Payer: COMMERCIAL

## 2024-02-07 DIAGNOSIS — L21.9 SEBORRHEIC DERMATITIS: ICD-10-CM

## 2024-02-07 RX ORDER — KETOCONAZOLE 20 MG/ML
SHAMPOO, SUSPENSION TOPICAL 3 TIMES WEEKLY
Qty: 120 ML | Refills: 11 | Status: SHIPPED | OUTPATIENT
Start: 2024-02-07

## 2024-02-09 ENCOUNTER — TREATMENT (OUTPATIENT)
Dept: PHYSICAL THERAPY | Facility: CLINIC | Age: 34
End: 2024-02-09
Payer: COMMERCIAL

## 2024-02-09 ENCOUNTER — APPOINTMENT (OUTPATIENT)
Dept: OBSTETRICS AND GYNECOLOGY | Facility: CLINIC | Age: 34
End: 2024-02-09
Payer: COMMERCIAL

## 2024-02-09 DIAGNOSIS — M25.552 LEFT HIP PAIN: ICD-10-CM

## 2024-02-09 DIAGNOSIS — M25.551 RIGHT HIP PAIN: Primary | ICD-10-CM

## 2024-02-09 PROCEDURE — 97113 AQUATIC THERAPY/EXERCISES: CPT | Mod: GP,CQ | Performed by: SPECIALIST/TECHNOLOGIST

## 2024-02-09 ASSESSMENT — PAIN - FUNCTIONAL ASSESSMENT: PAIN_FUNCTIONAL_ASSESSMENT: 0-10

## 2024-02-09 ASSESSMENT — PAIN SCALES - GENERAL: PAINLEVEL_OUTOF10: 8

## 2024-02-09 NOTE — PROGRESS NOTES
Physical Therapy Treatment    Patient Name: Alayna Andrea  MRN: [id number]  Today's Date: 2/9/2024  Time Calculation  Start Time: 1225  Stop Time: 1314  Time Calculation (min): 49 min    Insurance:   Visit number: 4  Approved number of visits: 30  Insurance: Odell    Current Problem  1. Right hip pain        2. Left hip pain            General  Reason for Referral: SI screw remova  Referred By: jah  Precautions  Precautions  Precautions Comment: none  Pain  Pain Assessment: 0-10  Pain Score: 8    Subjective:   Subjective   Patient reports using icy hot to manage back/hip pain.    Patient notes feeling tense on arrival.      Compliant with HEP? yes    Objective:   Objective   Slightly forward posture.      Treatments:   Aquatic Physical Therapy (54190):    Water Temp: 92*  Water Depth: 4 feet    Ambulation:  - forward/backward x 5'  - sidestepping x 5'    Standing Exercises:  - Marching in place x 5'  - Alternating hip abduction x 4'  - Hip flexion/extension x 3' R/L  - Heel Raises x 4'  - Minisquats x 3'  - alternating ham curl 4'    Stretches:  - HS stretches on step x 1' R/L    Deep water 5 ft with noodle:  - hang x 3'  - bicycles x 2'  - skiing x 2'    Assessment: .Patient tolerated intensity with minimal fatigue.          Plan: Continue aquatic therapy to improve core stability.

## 2024-02-09 NOTE — PROGRESS NOTES
History of Present Illness   Alayna Andrea is a 34 y.o. female presenting today for post-op check from removal of pelvic screws on 11/16/23. Overall doing better; her low back pain is gone. Has some L sided flank pain. Has had two therapy sessions, but goes to [facility] occasionally. Incisions are well healed without redness or drainage. Compliant with WB recommendations. Denies numbness, tingling, f/c, CP, SOB or any other complaints or concerns.      Past Medical History:   Diagnosis Date    Acute pain of both hips     Anemia     Anxiety     Bilateral hearing loss     Closed fracture of temporal bone with routine healing, subsequent encounter     Closed pelvic ring fracture with routine healing, subsequent encounter     Depression     Hypertension     Impaired gait and mobility     Morbid obesity (CMS/HCC)     Peripheral neuropathy     Vertigo        Medication Documentation Review Audit       Reviewed by Sammie Rodriguez LPN (Licensed Nurse) on 02/06/24 at 1023      Medication Order Taking? Sig Documenting Provider Last Dose Status   ARIPiprazole (Abilify) 5 mg tablet [id number] No Take 1 tablet (5 mg) by mouth once daily. BIRD Garcia Taking Active   benzoyl peroxide 5 % external wash [id number]  Apply 1 Application topically once daily. In the morning Vargas Mcdowell MD  Active   budesonide (Rhinocort AQ) 32 mcg/actuation nasal spray [id number]  Administer 2 sprays into each nostril once daily. Ino Warner MD  Active   clindamycin (Cleocin T) 1 % lotion [id number]  Apply topically 2 times a day. Vargas Mcdowell MD  Active   FLUoxetine (PROzac) 10 mg capsule [id number] No Take 1 capsule (10 mg) by mouth once daily. BIRD Garcia Taking Active   hydroCHLOROthiazide (HYDRODiuril) 25 mg tablet [id number] No Take 1 tablet (25 mg) by mouth once daily. BIRD Garcia Taking Active   ketoconazole (NIZOral) 2 % cream [id number]  Apply topically 2  "times a day. To affected areas of face Vargas Mcdowell MD  Active                    Allergies   Allergen Reactions    Shellfish Derived Other and Itching    Enoxaparin Itching and Other     \"My skin was burned up\"    Heparin Other     \"My arm was numb for 3 months\"    House Dust Other    Iodine Itching and Other     \"burning,throw up blood\"    Other reaction(s): Other: See Comments   \"burning,throw up blood\"    Ketorolac Other and Swelling     tongue swelling    \"my gums swelled up and start itching\", tolerates ibuprofen    Mold Other    Other Itching    Procaine Itching     Tolerates lidocaine patches 2019       Social History     Socioeconomic History    Marital status: Single     Spouse name: Not on file    Number of children: Not on file    Years of education: Not on file    Highest education level: Not on file   Occupational History    Not on file   Tobacco Use    Smoking status: Never    Smokeless tobacco: Never   Vaping Use    Vaping Use: Never used   Substance and Sexual Activity    Alcohol use: Never    Drug use: Never    Sexual activity: Not Currently     Partners: Male     Birth control/protection: Abstinence   Other Topics Concern    Not on file   Social History Narrative    Not on file     Social Determinants of Health     Financial Resource Strain: Not on file   Food Insecurity: Not on file   Transportation Needs: Not on file   Physical Activity: Not on file   Stress: Not on file   Social Connections: Not on file   Intimate Partner Violence: Not on file   Housing Stability: Low Risk  (2023)    Housing Stability Vital Sign     Unable to Pay for Housing in the Last Year: No     Number of Places Lived in the Last Year: 2     Unstable Housing in the Last Year: No       Past Surgical History:   Procedure Laterality Date     SECTION, CLASSIC  2020    CT ANGIO NECK  11/10/2018    CT NECK ANGIO W AND WO IV CONTRAST 11/10/2018 [facility] CLINICAL [facility]GAC[facility]    CT HEAD ANGIO W AND WO IV CONTRAST  " 11/10/2018    CT HEAD ANGIO W AND WO IV CONTRAST 11/10/2018 [facility] CLINICAL LEGAC[facility]    HIP SURGERY      NASAL SEPTUM SURGERY            Review of Systems:  30 point ROS reviewed and negative other than as listed in the HPI     Physical Exam:  Gen: The pt is A&Ox3, NAD, and appear state age and weight  Psychiatric: mood and affect are appropriate   Eyes: sclera are white, EOM grossly intact  ENT: MMM  Neck: supple, thyroid is midline  Respiratory: respirations are nonlabored, chest rise symmetric  CV: rate is regular by palpation of distal pulses  Abdomen: nondistended   Integument: no obvious cutaneous lesions noted. No signs of lymphangitis. No signs of systemic edema.   MSK:  pelvis  surgical incision well healing without edema, erythema, drainage, or other s/sx of infection. Appropriately tender to palpation around the incision. SILT throughout the leg intact. Intact plantarflexion and dorsiflexion. Foot warm and well perfused.      Imaging:  I personally reviewed multiple views of the  pelvis  were obtained in the office today demonstrate maintenance of reduction s/p removal of the hardware    Assessment   34 y.o. female post-op from removal of pelvic screws on 11/16/23.     Plan:  Continue WBAT on  b/l LE; encouraged to go to more PT sessions.     Follow up PRN.     All of the patient's questions/concerns address and they are in agreement with the plan.

## 2024-02-12 ENCOUNTER — APPOINTMENT (OUTPATIENT)
Dept: OBSTETRICS AND GYNECOLOGY | Facility: CLINIC | Age: 34
End: 2024-02-12
Payer: COMMERCIAL

## 2024-02-26 ENCOUNTER — APPOINTMENT (OUTPATIENT)
Dept: OBSTETRICS AND GYNECOLOGY | Facility: CLINIC | Age: 34
End: 2024-02-26
Payer: COMMERCIAL

## 2024-02-28 ENCOUNTER — APPOINTMENT (OUTPATIENT)
Dept: PHYSICAL THERAPY | Facility: CLINIC | Age: 34
End: 2024-02-28
Payer: COMMERCIAL

## 2024-03-04 ENCOUNTER — OFFICE VISIT (OUTPATIENT)
Dept: OTOLARYNGOLOGY | Facility: CLINIC | Age: 34
End: 2024-03-04
Payer: COMMERCIAL

## 2024-03-04 ENCOUNTER — APPOINTMENT (OUTPATIENT)
Dept: PRIMARY CARE | Facility: HOSPITAL | Age: 34
End: 2024-03-04
Payer: COMMERCIAL

## 2024-03-04 VITALS
RESPIRATION RATE: 20 BRPM | SYSTOLIC BLOOD PRESSURE: 148 MMHG | HEART RATE: 74 BPM | OXYGEN SATURATION: 99 % | BODY MASS INDEX: 50.02 KG/M2 | DIASTOLIC BLOOD PRESSURE: 92 MMHG | HEIGHT: 64 IN | WEIGHT: 293 LBS | TEMPERATURE: 98 F

## 2024-03-04 DIAGNOSIS — J34.89 NASAL OBSTRUCTION: Primary | ICD-10-CM

## 2024-03-04 PROCEDURE — 99213 OFFICE O/P EST LOW 20 MIN: CPT | Performed by: STUDENT IN AN ORGANIZED HEALTH CARE EDUCATION/TRAINING PROGRAM

## 2024-03-04 PROCEDURE — 3077F SYST BP >= 140 MM HG: CPT | Performed by: STUDENT IN AN ORGANIZED HEALTH CARE EDUCATION/TRAINING PROGRAM

## 2024-03-04 PROCEDURE — 3080F DIAST BP >= 90 MM HG: CPT | Performed by: STUDENT IN AN ORGANIZED HEALTH CARE EDUCATION/TRAINING PROGRAM

## 2024-03-04 PROCEDURE — 1036F TOBACCO NON-USER: CPT | Performed by: STUDENT IN AN ORGANIZED HEALTH CARE EDUCATION/TRAINING PROGRAM

## 2024-03-04 PROCEDURE — 3008F BODY MASS INDEX DOCD: CPT | Performed by: STUDENT IN AN ORGANIZED HEALTH CARE EDUCATION/TRAINING PROGRAM

## 2024-03-04 RX ORDER — OLOPATADINE HYDROCHLORIDE 665 UG/1
2 SPRAY NASAL 2 TIMES DAILY
Qty: 30 G | Refills: 3 | Status: SHIPPED | OUTPATIENT
Start: 2024-03-04

## 2024-03-04 ASSESSMENT — PAIN SCALES - GENERAL: PAINLEVEL: 7

## 2024-03-04 ASSESSMENT — ENCOUNTER SYMPTOMS
OCCASIONAL FEELINGS OF UNSTEADINESS: 0
DEPRESSION: 0
LOSS OF SENSATION IN FEET: 0

## 2024-03-04 ASSESSMENT — PATIENT HEALTH QUESTIONNAIRE - PHQ9
SUM OF ALL RESPONSES TO PHQ9 QUESTIONS 1 AND 2: 0
2. FEELING DOWN, DEPRESSED OR HOPELESS: NOT AT ALL
1. LITTLE INTEREST OR PLEASURE IN DOING THINGS: NOT AT ALL
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
## 2024-03-05 ENCOUNTER — APPOINTMENT (OUTPATIENT)
Dept: ORTHOPEDIC SURGERY | Facility: CLINIC | Age: 34
End: 2024-03-05
Payer: COMMERCIAL

## 2024-03-06 ENCOUNTER — APPOINTMENT (OUTPATIENT)
Dept: PHYSICAL THERAPY | Facility: CLINIC | Age: 34
End: 2024-03-06
Payer: COMMERCIAL

## 2024-03-11 ENCOUNTER — APPOINTMENT (OUTPATIENT)
Dept: PRIMARY CARE | Facility: HOSPITAL | Age: 34
End: 2024-03-11
Payer: COMMERCIAL

## 2024-03-13 ENCOUNTER — APPOINTMENT (OUTPATIENT)
Dept: PHYSICAL THERAPY | Facility: CLINIC | Age: 34
End: 2024-03-13
Payer: COMMERCIAL

## 2024-03-21 ENCOUNTER — APPOINTMENT (OUTPATIENT)
Dept: OBSTETRICS AND GYNECOLOGY | Facility: CLINIC | Age: 34
End: 2024-03-21
Payer: COMMERCIAL

## 2024-03-21 ENCOUNTER — TREATMENT (OUTPATIENT)
Dept: PHYSICAL THERAPY | Facility: CLINIC | Age: 34
End: 2024-03-21
Payer: COMMERCIAL

## 2024-03-21 DIAGNOSIS — M25.552 LEFT HIP PAIN: ICD-10-CM

## 2024-03-21 DIAGNOSIS — M25.551 RIGHT HIP PAIN: Primary | ICD-10-CM

## 2024-03-21 PROCEDURE — 97113 AQUATIC THERAPY/EXERCISES: CPT | Mod: GP,CQ

## 2024-03-21 ASSESSMENT — PAIN - FUNCTIONAL ASSESSMENT: PAIN_FUNCTIONAL_ASSESSMENT: 0-10

## 2024-03-21 ASSESSMENT — PAIN SCALES - GENERAL: PAINLEVEL_OUTOF10: 7
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
## 2024-03-28 ENCOUNTER — TREATMENT (OUTPATIENT)
Dept: PHYSICAL THERAPY | Facility: CLINIC | Age: 34
End: 2024-03-28
Payer: COMMERCIAL

## 2024-03-28 DIAGNOSIS — M25.551 RIGHT HIP PAIN: Primary | ICD-10-CM

## 2024-03-28 DIAGNOSIS — M25.552 LEFT HIP PAIN: ICD-10-CM

## 2024-03-28 PROCEDURE — 97113 AQUATIC THERAPY/EXERCISES: CPT | Mod: GP,CQ

## 2024-03-28 ASSESSMENT — PAIN - FUNCTIONAL ASSESSMENT: PAIN_FUNCTIONAL_ASSESSMENT: 0-10

## 2024-03-28 ASSESSMENT — PAIN SCALES - GENERAL: PAINLEVEL_OUTOF10: 6

## 2024-03-28 NOTE — PROGRESS NOTES
Physical Therapy  Physical Therapy Treatment    Patient Name: Alayna Andrea  MRN: [id number]  Today's Date: 3/28/2024    Time Calculation  Start Time: 1552  Stop Time: 1630  Time Calculation (min): 38 min    Insurance:   Visit number: 6  Approved number of visits: 30  Insurance: [employer]    Current Problem  1. Right hip pain        2. Left hip pain            Precautions  Precautions  Precautions Comment: none    Pain  Pain Assessment: 0-10  Pain Score: 6    Subjective:   Subjective   Patient reports feeling that when they walking in the pool they are having increased pain initially but it gets better as they go.     HEP Compliance: Fair    Objective:   Objective   Pt had decreased gait speed in and out of pool.     Treatments:     Exercise Sets/Reps Comments   Fwd/bwd 5' 3ft   Walking with marches 5'  3ft   Squats  5' 3ft   Lunges  5' 3ft    Hamstring stretch  1' Both 4ft    Dead hang  5' 5ft    Hip abduction  5' 4ft     Assessment:    Pt required VC for proper foot placement and body mechanics while performing lunges in 3ft depth on first step. Pt was given VC to maintain neutral back while performing hip abduction to decrease lateral trunk flexion compensation. Approximately 2.5 minutes were required to enter and to exit the pool due to reported anxiety of water.     Plan:   Continue to progress exercises in 3ft depth to increase resistance on muscles.        Anuel Marsh, PTA

## 2024-04-03 ENCOUNTER — APPOINTMENT (OUTPATIENT)
Dept: OTOLARYNGOLOGY | Facility: CLINIC | Age: 34
End: 2024-04-03
Payer: COMMERCIAL

## 2024-04-04 ENCOUNTER — OFFICE VISIT (OUTPATIENT)
Dept: DERMATOLOGY | Facility: CLINIC | Age: 34
End: 2024-04-04
Payer: COMMERCIAL

## 2024-04-04 DIAGNOSIS — L73.2 HIDRADENITIS SUPPURATIVA: Primary | ICD-10-CM

## 2024-04-04 DIAGNOSIS — L70.0 ACNE VULGARIS: ICD-10-CM

## 2024-04-04 DIAGNOSIS — L85.3 XEROSIS CUTIS: ICD-10-CM

## 2024-04-04 PROCEDURE — 11900 INJECT SKIN LESIONS </W 7: CPT | Performed by: DERMATOLOGY

## 2024-04-04 PROCEDURE — 3008F BODY MASS INDEX DOCD: CPT | Performed by: DERMATOLOGY

## 2024-04-04 PROCEDURE — 99214 OFFICE O/P EST MOD 30 MIN: CPT | Performed by: DERMATOLOGY

## 2024-04-04 RX ORDER — TRETINOIN 0.25 MG/G
1 CREAM TOPICAL NIGHTLY
Qty: 45 G | Refills: 11 | Status: SHIPPED | OUTPATIENT
Start: 2024-04-04

## 2024-04-05 NOTE — PROGRESS NOTES
"Divina Andrea \"Naveed\" is a 34 y.o. female who presents for the following: Acne and Hidradenitis Suppurativa.  She states she has been breaking out with more pimples on her face over the past few months.  She also notes a couple raised, red, painful cysts under her right arm, which have been present for a few months and have not resolved.  She denies any other active lesions currently.      Review of Systems:  No other skin or systemic complaints other than what is documented elsewhere in the note.    The following portions of the chart were reviewed this encounter and updated as appropriate:       Skin Cancer History  No skin cancer on file.    Specialty Problems          Dermatology Problems    Hidradenitis suppurativa       Past Dermatologic / Past Relevant Medical History:    - history of hidradenitis suppurativa  - no history of skin cancer    Family History:    No family history of melanoma or skin cancer    Social History:    The patient states she has lost a significant amount of weight recently    Allergies:  Shellfish derived, Enoxaparin, Heparin, House dust, Iodine, Ketorolac, Mold, Other, and Procaine    Current Medications / CAM's:    Current Outpatient Medications:     ARIPiprazole (Abilify) 5 mg tablet, Take 1 tablet (5 mg) by mouth once daily., Disp: 90 tablet, Rfl: 1    benzoyl peroxide 5 % external wash, Apply 1 Application topically once daily. In the morning, Disp: 227 g, Rfl: 11    clindamycin (Cleocin T) 1 % lotion, Apply topically 2 times a day., Disp: 60 mL, Rfl: 11    FLUoxetine (PROzac) 10 mg capsule, Take 1 capsule (10 mg) by mouth once daily., Disp: 90 capsule, Rfl: 1    hydroCHLOROthiazide (HYDRODiuril) 25 mg tablet, Take 1 tablet (25 mg) by mouth once daily., Disp: 90 tablet, Rfl: 2    ketoconazole (NIZOral) 2 % cream, Apply topically 2 times a day. To affected areas of face, Disp: 60 g, Rfl: 11    ketoconazole (NIZOral) 2 % shampoo, Apply topically 3 times a week., " Disp: 120 mL, Rfl: 11    olopatadine (Patanase) 0.6 % spray,non-aerosol nasal spray, Administer 2 sprays into affected nostril(s) 2 times a day., Disp: 30 g, Rfl: 3    tretinoin (Retin-A) 0.025 % cream, Apply 1 Application topically once daily at bedtime. Start 1-2 nights per week 1-2 weeks, inc to every other night, then every night as tolerated, Disp: 45 g, Rfl: 11  No current facility-administered medications for this visit.     Objective   Well appearing patient in no apparent distress; mood and affect are within normal limits.    A skin examination was performed including: Face, neck, and bilateral upper extremities. All findings within normal limits unless otherwise noted below.    Assessment/Plan   1. Hidradenitis suppurativa  Right Axilla  Scattered in her bilateral axillae, there are several open comedones; a few follicular-based erythematous, inflammatory papules as well as 2 cystic-appearing nodules in her right axilla; and several hyperpigmented macules and patches consistent with post-inflammatory hyperpigmentation    Hidradenitis Suppurativa -flare in bilateral axillae with 2 cystic nodules in right axilla.  The potentially chronic and intermittently flaring nature of this inflammatory condition and treatment options, including topical therapy, intra-lesional corticosteroid injections, oral antibiotics, other systemic therapy, such as with Humira, as well as surgical treatment options, were discussed extensively with the patient today.    After discussing the risks, benefits, and side effects of each of these options at length, including possible permanent skin atrophy following IL steroid injections, the patient expressed understanding and wishes to undergo IL steroid injection for the 2 cystic nodules in her right axilla today.  In addition, I recommend combination topical therapy with Benzoyl Peroxide 5% creamy wash, which the patient was instructed to use to wash the affected areas once or twice  daily, and Clindamycin 1% lotion, which the patient was instructed to apply twice daily to the affected areas or up to 3-4 times per day following application of warm compresses as needed for active lesions.  The risks, benefits, and side effects of these medications, including dryness and irritation with BP use, were discussed, and she was instructed to discontinue use of the BP wash should she become or attempt to become pregnant at any time in the future.  Lastly, I discussed the association of this condition with cigarette smoking and weight and encouraged smoking cessation and weight loss, which will likely help improve this condition.  She expressed understanding, is in agreement with this plan, and wishes to proceed with IL steroid injection today.    triamcinolone acetonide (Kenalog) injection 12 mg - Right Axilla      Related Medications  benzoyl peroxide 5 % external wash  Apply 1 Application topically once daily. In the morning    clindamycin (Cleocin T) 1 % lotion  Apply topically 2 times a day.    2. Acne vulgaris  Head - Anterior (Face)  Scattered on the patient's face, there are multiple open and closed comedones; a few erythematous, inflammatory papules and pustules; and several pink to hyperpigmented macules consistent with post-inflammatory hyperpigmentation    Acne Vulgaris -flare on face; mild-moderate; mixed comedonal and inflammatory types.  The nature of this condition and treatment options were discussed extensively with the patient today.  At this time, I recommend combination topical therapy with Benzoyl Peroxide 5% creamy wash once daily in the morning; Clindamycin 1% lotion twice daily or up to 3-4 times per day as needed for active lesions; and Tretinoin 0.025% cream at night.  The risks, benefits, and side effects of these medications, including the redness, dryness, and irritation expected with Tretinoin use, were discussed; the patient was instructed to begin use of Tretinoin 1-2  nights per week and increase to every other night, then every night as tolerated without excessive redness or irritation.  I also informed the patient it will likely take at least 2-3 months to notice any significant improvement with the treatment regimen outlined above, and she was instructed to discontinue use of these medications should she become or attempt to become pregnant at any time in the future.  She expressed understanding and is in agreement with this plan.    tretinoin (Retin-A) 0.025 % cream - Head - Anterior (Face)  Apply 1 Application topically once daily at bedtime. Start 1-2 nights per week 1-2 weeks, inc to every other night, then every night as tolerated    3. Xerosis cutis  Diffuse generalized dry, scaly skin.    Xerosis.  I emphasized the importance of dry, sensitive skin care, including the use of a mild soap, such as Dove, and frequent and aggressive moisturization, at least twice daily and immediately following showers or baths, with recommended over-the-counter moisturizing creams, such as Eucerin, Cetaphil, Cerave, or Aveeno, or Vaseline or Aquaphor ointments.

## 2024-04-09 ENCOUNTER — TELEPHONE (OUTPATIENT)
Dept: DERMATOLOGY | Facility: CLINIC | Age: 34
End: 2024-04-09
Payer: COMMERCIAL

## 2024-04-09 NOTE — TELEPHONE ENCOUNTER
Pharmacist left message that pt needs prior jethro , [facility] insurance member id# [id number] .. Prior auth was done in cover meds today waiting for response from [employer] [facility] ..  Pt prescription for her Tretinoin was approved called pharmacy , pharmacy tech will call [place] location where picked up prescription and let them know the outcome .0)

## 2024-04-11 ENCOUNTER — APPOINTMENT (OUTPATIENT)
Dept: OBSTETRICS AND GYNECOLOGY | Facility: CLINIC | Age: 34
End: 2024-04-11
Payer: COMMERCIAL

## 2024-04-22 ENCOUNTER — APPOINTMENT (OUTPATIENT)
Dept: PLASTIC SURGERY | Facility: CLINIC | Age: 34
End: 2024-04-22
Payer: COMMERCIAL

## 2024-04-22 ENCOUNTER — APPOINTMENT (OUTPATIENT)
Dept: OBSTETRICS AND GYNECOLOGY | Facility: CLINIC | Age: 34
End: 2024-04-22
Payer: COMMERCIAL

## 2024-04-24 ENCOUNTER — APPOINTMENT (OUTPATIENT)
Dept: OTOLARYNGOLOGY | Facility: CLINIC | Age: 34
End: 2024-04-24
Payer: COMMERCIAL

## 2024-04-29 PROBLEM — R06.89 DIFFICULTY BREATHING: Status: ACTIVE | Noted: 2024-04-29

## 2024-04-29 PROBLEM — J34.829 NASAL VALVE COLLAPSE: Status: ACTIVE | Noted: 2024-04-29

## 2024-04-29 PROBLEM — M95.0 NASAL VALVE COLLAPSE: Status: ACTIVE | Noted: 2024-04-29
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
## 2024-05-01 ENCOUNTER — OFFICE VISIT (OUTPATIENT)
Dept: OTOLARYNGOLOGY | Facility: CLINIC | Age: 34
End: 2024-05-01
Payer: COMMERCIAL

## 2024-05-01 DIAGNOSIS — J34.89 NASAL OBSTRUCTION: ICD-10-CM

## 2024-05-01 DIAGNOSIS — J34.3 HYPERTROPHY OF NASAL TURBINATES: ICD-10-CM

## 2024-05-01 DIAGNOSIS — M95.0 NASAL VALVE COLLAPSE: ICD-10-CM

## 2024-05-01 DIAGNOSIS — J34.2 DEVIATED SEPTUM: Primary | ICD-10-CM

## 2024-05-01 DIAGNOSIS — R06.89 DIFFICULTY BREATHING: ICD-10-CM

## 2024-05-01 PROCEDURE — 31231 NASAL ENDOSCOPY DX: CPT | Performed by: OTOLARYNGOLOGY

## 2024-05-01 PROCEDURE — 99213 OFFICE O/P EST LOW 20 MIN: CPT | Performed by: OTOLARYNGOLOGY

## 2024-05-01 PROCEDURE — 3008F BODY MASS INDEX DOCD: CPT | Performed by: OTOLARYNGOLOGY

## 2024-05-13 ENCOUNTER — APPOINTMENT (OUTPATIENT)
Dept: OBSTETRICS AND GYNECOLOGY | Facility: CLINIC | Age: 34
End: 2024-05-13
Payer: COMMERCIAL

## 2024-05-20 ENCOUNTER — APPOINTMENT (OUTPATIENT)
Dept: PRIMARY CARE | Facility: HOSPITAL | Age: 34
End: 2024-05-20
Payer: COMMERCIAL

## 2024-06-04 ENCOUNTER — DOCUMENTATION (OUTPATIENT)
Dept: PHYSICAL THERAPY | Facility: CLINIC | Age: 34
End: 2024-06-04
Payer: COMMERCIAL

## 2024-06-04 NOTE — PROGRESS NOTES
"Discharge Summary    Name: Alayna Andrea \"Naveed\"  MRN: [id number]  : 1990  Date: 24    Discharge Summary: PT    Discharge Information: Date of discharge 24, Date of last visit 3/28/24, Date of evaluation 24, and Number of attended visits 6    Therapy Summary: hip pain    Discharge Status: unknown     Rehab Discharge Reason: Failed to schedule and/or keep follow-up appointment(s)      "

## 2024-06-19 ENCOUNTER — APPOINTMENT (OUTPATIENT)
Dept: OTOLARYNGOLOGY | Facility: CLINIC | Age: 34
End: 2024-06-19
Payer: COMMERCIAL

## 2024-06-24 ENCOUNTER — APPOINTMENT (OUTPATIENT)
Dept: PLASTIC SURGERY | Facility: CLINIC | Age: 34
End: 2024-06-24
Payer: COMMERCIAL

## 2024-06-24 VITALS — BODY MASS INDEX: 50.02 KG/M2 | WEIGHT: 293 LBS | HEIGHT: 64 IN

## 2024-06-24 DIAGNOSIS — E66.01 CLASS 3 SEVERE OBESITY WITH BODY MASS INDEX (BMI) OF 50.0 TO 59.9 IN ADULT, UNSPECIFIED OBESITY TYPE, UNSPECIFIED WHETHER SERIOUS COMORBIDITY PRESENT (MULTI): Primary | ICD-10-CM

## 2024-06-24 PROCEDURE — 99204 OFFICE O/P NEW MOD 45 MIN: CPT | Performed by: PHYSICIAN ASSISTANT

## 2024-06-24 PROCEDURE — 3008F BODY MASS INDEX DOCD: CPT | Performed by: PHYSICIAN ASSISTANT
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
## 2024-06-26 ENCOUNTER — OFFICE VISIT (OUTPATIENT)
Dept: ORTHOPEDIC SURGERY | Facility: CLINIC | Age: 34
End: 2024-06-26
Payer: COMMERCIAL

## 2024-06-26 ENCOUNTER — HOSPITAL ENCOUNTER (OUTPATIENT)
Dept: RADIOLOGY | Facility: CLINIC | Age: 34
Discharge: HOME | End: 2024-06-26
Payer: COMMERCIAL

## 2024-06-26 VITALS — WEIGHT: 293 LBS | BODY MASS INDEX: 50.02 KG/M2 | HEIGHT: 64 IN

## 2024-06-26 DIAGNOSIS — M79.604 RIGHT LEG PAIN: ICD-10-CM

## 2024-06-26 DIAGNOSIS — M25.551 HIP PAIN, CHRONIC, RIGHT: Primary | ICD-10-CM

## 2024-06-26 DIAGNOSIS — G89.29 CHRONIC LOW BACK PAIN, UNSPECIFIED BACK PAIN LATERALITY, UNSPECIFIED WHETHER SCIATICA PRESENT: ICD-10-CM

## 2024-06-26 DIAGNOSIS — M54.50 CHRONIC LOW BACK PAIN, UNSPECIFIED BACK PAIN LATERALITY, UNSPECIFIED WHETHER SCIATICA PRESENT: ICD-10-CM

## 2024-06-26 DIAGNOSIS — G89.29 HIP PAIN, CHRONIC, RIGHT: Primary | ICD-10-CM

## 2024-06-26 DIAGNOSIS — M25.551 HIP PAIN, CHRONIC, RIGHT: ICD-10-CM

## 2024-06-26 DIAGNOSIS — G89.29 HIP PAIN, CHRONIC, RIGHT: ICD-10-CM

## 2024-06-26 PROCEDURE — 99214 OFFICE O/P EST MOD 30 MIN: CPT | Performed by: PHYSICIAN ASSISTANT

## 2024-06-26 PROCEDURE — 72190 X-RAY EXAM OF PELVIS: CPT

## 2024-06-26 PROCEDURE — 1036F TOBACCO NON-USER: CPT | Performed by: PHYSICIAN ASSISTANT

## 2024-06-26 PROCEDURE — 72170 X-RAY EXAM OF PELVIS: CPT | Performed by: RADIOLOGY

## 2024-06-26 PROCEDURE — 3008F BODY MASS INDEX DOCD: CPT | Performed by: PHYSICIAN ASSISTANT

## 2024-06-26 ASSESSMENT — PAIN - FUNCTIONAL ASSESSMENT: PAIN_FUNCTIONAL_ASSESSMENT: 0-10

## 2024-06-26 ASSESSMENT — PAIN SCALES - GENERAL: PAINLEVEL_OUTOF10: 5 - MODERATE PAIN

## 2024-06-26 ASSESSMENT — PAIN DESCRIPTION - DESCRIPTORS: DESCRIPTORS: ACHING;SORE

## 2024-06-27 ENCOUNTER — TELEPHONE (OUTPATIENT)
Dept: OTOLARYNGOLOGY | Facility: CLINIC | Age: 34
End: 2024-06-27
Payer: COMMERCIAL

## 2024-06-27 ENCOUNTER — DOCUMENTATION (OUTPATIENT)
Dept: OTOLARYNGOLOGY | Facility: CLINIC | Age: 34
End: 2024-06-27
Payer: COMMERCIAL

## 2024-06-27 DIAGNOSIS — J34.3 HYPERTROPHY OF NASAL TURBINATES: ICD-10-CM

## 2024-06-27 DIAGNOSIS — J34.89 NASAL OBSTRUCTION: Primary | ICD-10-CM

## 2024-06-27 DIAGNOSIS — R06.89 DIFFICULTY BREATHING: ICD-10-CM

## 2024-06-27 NOTE — TELEPHONE ENCOUNTER
"    Facial Plastic & Reconstructive Surgery    Called patient back following review of CT Sinus with Dr. Ramírez.    Septum is midline.    Plan:  Patient given the following instructions to help her with her right sided nasal congestion:  -No surgical intervention is warranted due to no anatomical obstruction in nasal airway  -Flonase 2 sprays \"up and out\" twice a day    Orin Kelly PA-C    "

## 2024-06-28 NOTE — PROGRESS NOTES
History of Present Illness   Alayna Andrea is a 34 y.o. female presenting today for complaints of left flank pain.  At her visit in February she was complaining of left flank pain at this time as well and advised to attend physical therapy and continue on her weight loss journey.  She is following with therapy sessions which seem to help her discomfort but has since stopped going due to transportation issues.  She has no complaints of low back pain over the last.  Her incisions are well-healed.  Her flank pain is a mild cramping sensation without radiation.  No urinary complaints.  No other complaints or concerns.      Past Medical History:   Diagnosis Date    Acute pain of both hips     Anemia     Anxiety     Bilateral hearing loss     Closed fracture of temporal bone with routine healing, subsequent encounter     Closed pelvic ring fracture with routine healing, subsequent encounter     Depression     Hypertension     Impaired gait and mobility     Morbid obesity (Multi)     Peripheral neuropathy     Vertigo        Medication Documentation Review Audit       Reviewed by Shawna Dos Santos LPN (Licensed Nurse) on 06/26/24 at 0938      Medication Order Taking? Sig Documenting Provider Last Dose Status   ARIPiprazole (Abilify) 5 mg tablet [id number] No Take 1 tablet (5 mg) by mouth once daily. IVANIA Garcia-CNP Taking Active   benzoyl peroxide 5 % external wash [id number]  Apply 1 Application topically once daily. In the morning Vargas Mcdowell MD  Active   clindamycin (Cleocin T) 1 % lotion [id number]  Apply topically 2 times a day. Vargas Mcdowell MD  Active   FLUoxetine (PROzac) 10 mg capsule [id number] No Take 1 capsule (10 mg) by mouth once daily. BIRD Garcia Taking Active   hydroCHLOROthiazide (HYDRODiuril) 25 mg tablet [id number] No Take 1 tablet (25 mg) by mouth once daily. BIRD Garcia Taking Active   ketoconazole (NIZOral) 2 % cream [id number]   "Apply topically 2 times a day. To affected areas of face Vargas Mcdowell MD  Active   ketoconazole (NIZOral) 2 % shampoo [id number]  Apply topically 3 times a week. Vargas Mcdowell MD  Active   olopatadine (Patanase) 0.6 % spray,non-aerosol nasal spray [id number]  Administer 2 sprays into affected nostril(s) 2 times a day. Ino Warner MD  Active   tretinoin (Retin-A) 0.025 % cream [id number]  Apply 1 Application topically once daily at bedtime. Start 1-2 nights per week 1-2 weeks, inc to every other night, then every night as tolerated Vargas Mcdowell MD  Active                    Allergies   Allergen Reactions    Shellfish Derived Other and Itching    Enoxaparin Itching and Other     \"My skin was burned up\"    Heparin Other     \"My arm was numb for 3 months\"    House Dust Other    Iodine Itching and Other     \"burning,throw up blood\"    Other reaction(s): Other: See Comments   \"burning,throw up blood\"    Ketorolac Other and Swelling     tongue swelling    \"my gums swelled up and start itching\", tolerates ibuprofen    Mold Other    Other Itching    Procaine Itching     Tolerates lidocaine patches 7/2019       Social History     Socioeconomic History    Marital status: Single     Spouse name: Not on file    Number of children: Not on file    Years of education: Not on file    Highest education level: Not on file   Occupational History    Not on file   Tobacco Use    Smoking status: Never    Smokeless tobacco: Never   Vaping Use    Vaping status: Never Used   Substance and Sexual Activity    Alcohol use: Never    Drug use: Never    Sexual activity: Not Currently     Partners: Male     Birth control/protection: Abstinence   Other Topics Concern    Not on file   Social History Narrative    Not on file     Social Determinants of Health     Financial Resource Strain: Patient Declined (5/24/2023)    Received from [facility]    Overall Financial Resource Strain (CARDIA)     Difficulty of Paying Living Expenses: Patient " declined   Food Insecurity: Patient Declined (2023)    Received from [facility]    Hunger Vital Sign     Worried About Running Out of Food in the Last Year: Patient declined     Ran Out of Food in the Last Year: Patient declined   Transportation Needs: No Transportation Needs (2023)    Received from [facility]    PRAPARE - Transportation     Lack of Transportation (Medical): No     Lack of Transportation (Non-Medical): No   Physical Activity: Insufficiently Active (2023)    Received from [facility]    Exercise Vital Sign     Days of Exercise per Week: 2 days     Minutes of Exercise per Session: 20 min   Stress: Patient Declined (2023)    Received from [facility]    [employer] [place] [employer] - Occupational Stress Questionnaire     Feeling of Stress : Patient declined   Social Connections: Moderately Integrated (2023)    Received from [employer] [facility]    Social Connection and Isolation Panel [NHANES]     Frequency of Communication with Friends and Family: More than three times a week     Frequency of Social Gatherings with Friends and Family: Twice a week     Attends Islam Services: More than 4 times per year     Active Member of Clubs or Organizations: Yes     Attends Club or Organization Meetings: More than 4 times per year     Marital Status: Never    Intimate Partner Violence: Not At Risk (2023)    Received from [employer]    Humiliation, Afraid, Rape, and Kick questionnaire     Fear of Current or Ex-Partner: No     Emotionally Abused: No     Physically Abused: No     Sexually Abused: No   Housing Stability: Low Risk  (2023)    Housing Stability Vital Sign     Unable to Pay for Housing in the Last Year: No     Number of Places Lived in the Last Year: 2     Unstable Housing in the Last Year: No       Past Surgical History:   Procedure Laterality Date     SECTION, CLASSIC  2020    CT ANGIO NECK  11/10/2018    CT NECK ANGIO W AND WO IV  [facility] HEAD ANGIO W AND WO IV CONTRAST  11/10/2018    CT HEAD ANGIO W AND WO IV CONTRAST [facility]    HIP SURGERY      NASAL SEPTUM SURGERY            Review of Systems:  30 point ROS reviewed and negative other than as listed in the HPI     Physical Exam:  Gen: The pt is A&Ox3, NAD, and appear state age and weight  Psychiatric: mood and affect are appropriate   Eyes: sclera are white, EOM grossly intact  ENT: MMM  Neck: supple, thyroid is midline  Respiratory: respirations are nonlabored, chest rise symmetric  CV: rate is regular by palpation of distal pulses  Abdomen: nondistended   Integument: no obvious cutaneous lesions noted. No signs of lymphangitis. No signs of systemic edema.   MSK:  pelvis  surgical incision well healing without edema, erythema, drainage, or other s/sx of infection. Appropriately tender to palpation around the incision. SILT throughout the leg intact. Intact plantarflexion and dorsiflexion. Foot warm and well perfused.  Left flank musculoskeletal tenderness to palpation.       Imaging:  No new imaging  Assessment   34 y.o. female post-op from removal of pelvic screws on 11/16/23   Left flank pain      Plan:  I reviewed in detail with pt her injury hx and the pathophysiology of a pelvic fx. I believe her flank pain is more related to her weight and I advised her to continue with her exercise/weight loss journey. PT referral renewed per pt request. No further follow up needed in our office for this complaint.     All of the patient's questions/concerns address and they are in agreement with the plan.

## 2024-07-17 ENCOUNTER — APPOINTMENT (OUTPATIENT)
Dept: OTOLARYNGOLOGY | Facility: CLINIC | Age: 34
End: 2024-07-17
Payer: COMMERCIAL

## 2024-08-19 ENCOUNTER — TELEMEDICINE (OUTPATIENT)
Dept: PRIMARY CARE | Facility: CLINIC | Age: 34
End: 2024-08-19
Payer: COMMERCIAL

## 2024-08-19 DIAGNOSIS — N92.6 IRREGULAR MENSTRUAL CYCLE: Primary | ICD-10-CM

## 2024-08-19 PROCEDURE — 99212 OFFICE O/P EST SF 10 MIN: CPT

## 2024-08-19 NOTE — PROGRESS NOTES
Subjective   Patient ID: Naveed Andrea is a 34 y.o. female who presents for menstrual abnormalities.     With patient's permission, this is a Telemedicine visit with video and audio. Provider located at office address. Patient located at their home address. All issues as documented below were discussed and addressed but limited physical exam was performed. If it was felt that the patient should be evaluated via face-to-face office appointment(s) they were directed to appropriate location.     HPI   Reports missed period. LMP 7/14/24, regular. Reports increase in stress. Has not been sexually active for about 4 months. Hx PCOS. Requesting birth control. Does not have PCP. Denies abdominal pain, chest pain, SOB, vaginal bleeding, nausea, vomiting.     Review of Systems  All other systems have been reviewed and are negative except as noted in the HPI.     Objective   There were no vitals taken for this visit.    Physical Exam  Not performed due to limitations virtual telemedicine encounter.     Assessment/Plan   Problem List Items Addressed This Visit             ICD-10-CM    Irregular menstrual cycle - Primary  Acute on chronic. Referral to primary care to establish care and discuss treatment options. Patient understands and agrees with plan.  N92.6    Relevant Orders    Referral to Primary Care

## 2024-09-28 ENCOUNTER — TELEMEDICINE (OUTPATIENT)
Dept: PRIMARY CARE | Facility: CLINIC | Age: 34
End: 2024-09-28
Payer: COMMERCIAL

## 2024-09-28 DIAGNOSIS — S29.012A MUSCLE STRAIN OF UPPER BACK: Primary | ICD-10-CM

## 2024-09-28 RX ORDER — METHYLPREDNISOLONE 4 MG/1
TABLET ORAL
Qty: 21 TABLET | Refills: 0 | Status: SHIPPED | OUTPATIENT
Start: 2024-09-28 | End: 2024-10-05

## 2024-09-28 RX ORDER — CYCLOBENZAPRINE HCL 5 MG
5 TABLET ORAL 3 TIMES DAILY PRN
Qty: 30 TABLET | Refills: 0 | Status: SHIPPED | OUTPATIENT
Start: 2024-09-28 | End: 2024-10-28

## 2024-09-28 ASSESSMENT — ENCOUNTER SYMPTOMS
NAUSEA: 0
TINGLING: 0
DIZZINESS: 0
VOMITING: 0
PARESTHESIAS: 0
APPETITE CHANGE: 0
MYALGIAS: 1
FEVER: 0
HEADACHES: 0
ACTIVITY CHANGE: 1
DIFFICULTY URINATING: 0
BACK PAIN: 1
WEAKNESS: 0
SHORTNESS OF BREATH: 0
NUMBNESS: 0
PERIANAL NUMBNESS: 0
FATIGUE: 0
LIGHT-HEADEDNESS: 0

## 2024-09-28 NOTE — PATIENT INSTRUCTIONS
Pleasure meeting with you today!    Let me know if you need anything.     Please send me a MyChart message if you have any questions or concerns.  FOR NON URGENT questions only.  Allow up to 72 hours for response.     If you have prescription issues or other questions you can email   Carmen Chance,  Digital Health Coordinator, at   [email or web address]

## 2024-09-28 NOTE — PROGRESS NOTES
Subjective   Patient ID: Aung Andrea is a 34 y.o. adult who presents for Back Pain.    Back eliseo  Was exercising and injured back  Middle/left back   Pain with arm up with arms down back eliseo  Using ice pack currently  History of back surgery       Back Pain  This is a new problem. The current episode started today. The pain is present in the thoracic spine. The quality of the pain is described as cramping and shooting. The pain does not radiate. The pain is at a severity of 7/10. The pain is moderate. The symptoms are aggravated by standing, position and twisting. Pertinent negatives include no chest pain, fever, headaches, numbness, paresthesias, perianal numbness, tingling or weakness. Risk factors: h/o back surgery last november. Aung has tried ice, NSAIDs and bed rest for the symptoms. The treatment provided mild relief.        Review of Systems   Constitutional:  Positive for activity change. Negative for appetite change, fatigue and fever.   Respiratory:  Negative for shortness of breath.    Cardiovascular:  Negative for chest pain.   Gastrointestinal:  Negative for nausea and vomiting.   Genitourinary:  Negative for difficulty urinating.   Musculoskeletal:  Positive for back pain and myalgias.   Neurological:  Negative for dizziness, tingling, weakness, light-headedness, numbness, headaches and paresthesias.       Objective   There were no vitals taken for this visit.    Physical Exam  Constitutional:       General: Aung is not in acute distress.     Appearance: Normal appearance. Aung is obese. Aung is not ill-appearing.      Comments: On Demand Virtual Visit Patient Consent     An interactive audio and video telecommunication system which permits real time communications between the patient (at the originating site) and provider (at the distant site) was utilized to provide this telehealth service.   Verbal consent was requested and obtained from Alayna Andrea (or parent if  under 18) on this date, 24 for a telehealth visit.   I have verbally confirmed with Alayna Andrea (or parent if under 18) that they are physically located in the [facility] during this virtual visit.    I performed this visit using realtime telehealth tools, including an audio/video OR telephone connection between the patient listed who was located in the [facility] and myself, Maninder Martin CNP (licensed in the [facility]).  At the start of the visit, I introduced myself as Maninder Martin, Nurse practitioner and verified the patients name, , and current physical location.    If they were currently outside of the state of [place], the visit was ended and the patient was referred to alternative means for evaluation and treatment.   The patient was made aware of the limitations of the telehealth visit.  They will not be physically examined and all issues may not be appropriate for a telehealth visit.  If necessary, an in person referral will be made.       DISCLAIMER:   In preparing for this visit and writing this note, I reviewed previous electronic medical records (labs, imaging and medical charts) available.  Significant findings which helped in decision making are recorded in this encounter charting.    Telemedicine appropriate evaluation completed.  Unable to perform complete physical exam due to virtual visit, patient was visualized on interactive video.      Pulmonary:      Effort: Pulmonary effort is normal.   Neurological:      Mental Status: Anghellic is alert and oriented to person, place, and time.         Assessment/Plan   Diagnoses and all orders for this visit:  Muscle strain of upper back  -     methylPREDNISolone (Medrol Dospak) 4 mg tablets; Take as directed on package.  -     cyclobenzaprine (Flexeril) 5 mg tablet; Take 1 tablet (5 mg) by mouth 3 times a day as needed for muscle spasms.  Follow up with PCP and physical therapy

## 2024-10-09 ENCOUNTER — OFFICE VISIT (OUTPATIENT)
Dept: ORTHOPEDIC SURGERY | Facility: CLINIC | Age: 34
End: 2024-10-09
Payer: COMMERCIAL

## 2024-10-09 ENCOUNTER — APPOINTMENT (OUTPATIENT)
Dept: PRIMARY CARE | Facility: CLINIC | Age: 34
End: 2024-10-09
Payer: COMMERCIAL

## 2024-10-09 DIAGNOSIS — M54.50 CHRONIC LOW BACK PAIN, UNSPECIFIED BACK PAIN LATERALITY, UNSPECIFIED WHETHER SCIATICA PRESENT: ICD-10-CM

## 2024-10-09 DIAGNOSIS — M25.561 RIGHT KNEE PAIN, UNSPECIFIED CHRONICITY: ICD-10-CM

## 2024-10-09 DIAGNOSIS — G89.29 CHRONIC LOW BACK PAIN, UNSPECIFIED BACK PAIN LATERALITY, UNSPECIFIED WHETHER SCIATICA PRESENT: ICD-10-CM

## 2024-10-09 PROCEDURE — 99214 OFFICE O/P EST MOD 30 MIN: CPT | Performed by: PHYSICIAN ASSISTANT

## 2024-10-09 PROCEDURE — 1036F TOBACCO NON-USER: CPT | Performed by: PHYSICIAN ASSISTANT

## 2024-10-10 NOTE — PROGRESS NOTES
History of Present Illness   Alayna Andrea is a 34 y.o. adult presenting today for continued complaints of left flank pain. Has still not done therapy. Recently seen by PCP for same complaint and rx steroid taper which se has not taken.   Her flank pain is stil a mild cramping sensation without radiation.  No urinary complaints.  No other complaints or concerns.      Past Medical History:   Diagnosis Date    Acute pain of both hips     Anemia     Anxiety     Bilateral hearing loss     Closed fracture of temporal bone with routine healing, subsequent encounter     Closed pelvic ring fracture with routine healing, subsequent encounter     Depression     Hypertension     Impaired gait and mobility     Morbid obesity (Multi)     Peripheral neuropathy     Vertigo        Medication Documentation Review Audit       Reviewed by Stefany Mcdowell PA-C (Physician Assistant) on 06/28/24 at 0820      Medication Order Taking? Sig Documenting Provider Last Dose Status   ARIPiprazole (Abilify) 5 mg tablet [id number] No Take 1 tablet (5 mg) by mouth once daily. BIRD Garcia Taking Active   benzoyl peroxide 5 % external wash [id number]  Apply 1 Application topically once daily. In the morning Vargas Mcdowell MD  Active   clindamycin (Cleocin T) 1 % lotion [id number]  Apply topically 2 times a day. Vargas Mcdowell MD  Active   FLUoxetine (PROzac) 10 mg capsule [id number] No Take 1 capsule (10 mg) by mouth once daily. BIRD Garcia Taking Active   hydroCHLOROthiazide (HYDRODiuril) 25 mg tablet [id number] No Take 1 tablet (25 mg) by mouth once daily. BIRD Garcia Taking Active   ketoconazole (NIZOral) 2 % cream [id number]  Apply topically 2 times a day. To affected areas of face Vargas Mcdowell MD  Active   ketoconazole (NIZOral) 2 % shampoo [id number]  Apply topically 3 times a week. Vargas Mcdowell MD  Active   olopatadine (Patanase) 0.6 % spray,non-aerosol nasal spray  "[id number]  Administer 2 sprays into affected nostril(s) 2 times a day. Ino Warner MD  Active   tretinoin (Retin-A) 0.025 % cream [id number]  Apply 1 Application topically once daily at bedtime. Start 1-2 nights per week 1-2 weeks, inc to every other night, then every night as tolerated Vargas Mcdowell MD  Active                    Allergies   Allergen Reactions    Shellfish Derived Other and Itching    Enoxaparin Itching and Other     \"My skin was burned up\"    Heparin Other     \"My arm was numb for 3 months\"    House Dust Other    Iodine Itching and Other     \"burning,throw up blood\"    Other reaction(s): Other: See Comments   \"burning,throw up blood\"    Ketorolac Other and Swelling     tongue swelling    \"my gums swelled up and start itching\", tolerates ibuprofen    Mold Other    Other Itching    Procaine Itching     Tolerates lidocaine patches 7/2019       Social History     Socioeconomic History    Marital status: Unknown     Spouse name: Not on file    Number of children: Not on file    Years of education: Not on file    Highest education level: Not on file   Occupational History    Not on file   Tobacco Use    Smoking status: Never    Smokeless tobacco: Never   Vaping Use    Vaping status: Never Used   Substance and Sexual Activity    Alcohol use: Never    Drug use: Never    Sexual activity: Not Currently     Partners: Male     Birth control/protection: Abstinence   Other Topics Concern    Not on file   Social History Narrative    Not on file     Social Determinants of Health     Financial Resource Strain: Patient Declined (5/24/2023)    Received from [facility]    Overall Financial Resource Strain (CARDIA)     Difficulty of Paying Living Expenses: Patient declined   Food Insecurity: Patient Declined (5/24/2023)    Received from [facility]    Hunger Vital Sign     Worried About Running Out of Food in the Last Year: Patient declined     Ran Out of Food in the Last Year: Patient " declined   Transportation Needs: No Transportation Needs (2023)    Received from [facility]    PRAPARE - Transportation     Lack of Transportation (Medical): No     Lack of Transportation (Non-Medical): No   Physical Activity: Insufficiently Active (2023)    Received from [facility]    Exercise Vital Sign     Days of Exercise per Week: 2 days     Minutes of Exercise per Session: 20 min   Stress: Patient Declined (2023)    Received from [facility]    [employer] [place] [employer] - Occupational Stress Questionnaire     Feeling of Stress : Patient declined   Social Connections: Moderately Integrated (2023)    Received from [facility]    Social Connection and Isolation Panel [NHANES]     Frequency of Communication with Friends and Family: More than three times a week     Frequency of Social Gatherings with Friends and Family: Twice a week     Attends Denominational Services: More than 4 times per year     Active Member of Clubs or Organizations: Yes     Attends Club or Organization Meetings: More than 4 times per year     Marital Status: Never    Intimate Partner Violence: Not At Risk (2023)    Received from [facility]    Humiliation, Afraid, Rape, and Kick questionnaire     Fear of Current or Ex-Partner: No     Emotionally Abused: No     Physically Abused: No     Sexually Abused: No   Housing Stability: Low Risk  (2023)    Housing Stability Vital Sign     Unable to Pay for Housing in the Last Year: No     Number of Places Lived in the Last Year: 2     Unstable Housing in the Last Year: No       Past Surgical History:   Procedure Laterality Date     SECTION, CLASSIC  2020    CT ANGIO NECK  11/10/2018    CT NECK ANGIO W AND WO IV CONTRAST 11/10/2018 [facility]ICAL [facility]    CT HEAD ANGIO W AND WO IV CONTRAST  11/10/2018    CT HEAD ANGIO W AND WO IV CONTRAST 11/10/2018 [facility] CLINICAL LEGACY    HIP  SURGERY      NASAL SEPTUM SURGERY            Review of Systems:  30 point ROS reviewed and negative other than as listed in the HPI     Physical Exam:  Gen: The pt is A&Ox3, NAD, and appear state age and weight  Psychiatric: mood and affect are appropriate   Eyes: sclera are white, EOM grossly intact  ENT: MMM  Neck: supple, thyroid is midline  Respiratory: respirations are nonlabored, chest rise symmetric  CV: rate is regular by palpation of distal pulses  Abdomen: nondistended   Integument: no obvious cutaneous lesions noted. No signs of lymphangitis. No signs of systemic edema.   MSK:  pelvis  surgical incision well healing without edema, erythema, drainage, or other s/sx of infection. Appropriately tender to palpation around the incision. SILT throughout the leg intact. Intact plantarflexion and dorsiflexion. Foot warm and well perfused.  Left flank musculoskeletal tenderness to palpation.       Imaging:  No new imaging  Assessment   34 y.o. adult post-op from removal of pelvic screws on 11/16/23   Left flank pain      Plan:  I reviewed in detail with pt her injury hx and the pathophysiology of a pelvic fx. She does have a retained washer from her original pelvic surgery in November 2023 but this is on the right side of her pelvis and a ways away from her pain. I reviewed her imaging in detail with her as she was convinced the washer was on the left, but it is in fact on the right. PT referral renewed per pt request. No further follow up needed in our office for this complaint and she should see spine if she has any further issues.     All of the patient's questions/concerns address and they are in agreement with the plan.

## 2024-10-24 ENCOUNTER — TELEPHONE (OUTPATIENT)
Dept: DERMATOLOGY | Facility: CLINIC | Age: 34
End: 2024-10-24
Payer: COMMERCIAL

## 2024-10-24 NOTE — TELEPHONE ENCOUNTER
Pharmacy left message wanting clarification of rx for tretinoin , for insurance purposes they want to know the quantity of gms to be used daily ,, returned call left message pt will be using 1.5 gm per use daily

## 2024-10-27 ENCOUNTER — APPOINTMENT (OUTPATIENT)
Dept: PRIMARY CARE | Facility: CLINIC | Age: 34
End: 2024-10-27
Payer: COMMERCIAL

## 2024-10-27 ASSESSMENT — LIFESTYLE VARIABLES: HISTORY_OF_SMOKING: I HAVE NEVER SMOKED

## 2024-11-11 ENCOUNTER — APPOINTMENT (OUTPATIENT)
Dept: PRIMARY CARE | Facility: CLINIC | Age: 34
End: 2024-11-11
Payer: COMMERCIAL

## 2024-11-15 ENCOUNTER — APPOINTMENT (OUTPATIENT)
Dept: OBSTETRICS AND GYNECOLOGY | Facility: CLINIC | Age: 34
End: 2024-11-15
Payer: COMMERCIAL

## 2024-11-21 ENCOUNTER — APPOINTMENT (OUTPATIENT)
Dept: INTEGRATIVE MEDICINE | Facility: CLINIC | Age: 34
End: 2024-11-21
Payer: COMMERCIAL

## 2024-12-05 ENCOUNTER — TELEPHONE (OUTPATIENT)
Dept: ORTHOPEDIC SURGERY | Facility: HOSPITAL | Age: 34
End: 2024-12-05
Payer: MEDICAID

## 2024-12-05 NOTE — TELEPHONE ENCOUNTER
Copied from CRM #[id number]. Topic: Information Request - Doctor, Hospital, or Provider  >> Dec 5, 2024  2:50 PM Linda HITCHCOCK wrote:  Patients insurance is requesting a copy of the physical therapy order  Fax:[phone]

## 2024-12-26 ENCOUNTER — APPOINTMENT (OUTPATIENT)
Dept: ORTHOPEDIC SURGERY | Facility: CLINIC | Age: 34
End: 2024-12-26
Payer: COMMERCIAL

## (undated) DEVICE — COVER, C-ARM W/CLIPS, OEC GE

## (undated) DEVICE — COVER, BACK TABLE, 65 X 90, HVY REINFORCED

## (undated) DEVICE — TOWEL, SURGICAL, NEURO, O/R, 16 X 26, BLUE, STERILE

## (undated) DEVICE — DRAPE, SHEET, U, W/ADHESIVE STRIP, IMPERVIOUS, 60 X 70 IN, DISPOSABLE, LF, STERILE

## (undated) DEVICE — TRAY, MINOR, SINGLE BASIN, STERILE

## (undated) DEVICE — DRAPE, SHEET, THREE QUARTER, FAN FOLD, 57 X 77 IN

## (undated) DEVICE — BANDAGE, GAUZE, CONFORMING, KERLIX, 6 PLY, 4.5 IN X 4.1 YD

## (undated) DEVICE — STAPLER, SKIN PROXIMATE, 35 WIDE

## (undated) DEVICE — Device

## (undated) DEVICE — DRAPE COVER, C ARM, FLOUROSCAN IMAGING SYS

## (undated) DEVICE — ELECTRODE, ELECTROSURGICAL, BLADE, STANDARD, 2.75 IN

## (undated) DEVICE — COVER, CART, 45 X 27 X 48 IN, CLEAR